# Patient Record
Sex: FEMALE | Race: BLACK OR AFRICAN AMERICAN | NOT HISPANIC OR LATINO | Employment: OTHER | RURAL
[De-identification: names, ages, dates, MRNs, and addresses within clinical notes are randomized per-mention and may not be internally consistent; named-entity substitution may affect disease eponyms.]

---

## 2022-05-02 ENCOUNTER — OFFICE VISIT (OUTPATIENT)
Dept: ORTHOPEDICS | Facility: CLINIC | Age: 67
End: 2022-05-02
Payer: MEDICARE

## 2022-05-02 VITALS — BODY MASS INDEX: 21.35 KG/M2 | HEIGHT: 62 IN | WEIGHT: 116 LBS

## 2022-05-02 DIAGNOSIS — M25.512 ACUTE PAIN OF LEFT SHOULDER: ICD-10-CM

## 2022-05-02 DIAGNOSIS — G56.03 BILATERAL CARPAL TUNNEL SYNDROME: Primary | ICD-10-CM

## 2022-05-02 PROCEDURE — 20610 DRAIN/INJ JOINT/BURSA W/O US: CPT | Mod: LT,,, | Performed by: NURSE PRACTITIONER

## 2022-05-02 PROCEDURE — 1159F MED LIST DOCD IN RCRD: CPT | Mod: CPTII,,, | Performed by: NURSE PRACTITIONER

## 2022-05-02 PROCEDURE — 3008F BODY MASS INDEX DOCD: CPT | Mod: CPTII,,, | Performed by: NURSE PRACTITIONER

## 2022-05-02 PROCEDURE — 1159F PR MEDICATION LIST DOCUMENTED IN MEDICAL RECORD: ICD-10-PCS | Mod: CPTII,,, | Performed by: NURSE PRACTITIONER

## 2022-05-02 PROCEDURE — 3008F PR BODY MASS INDEX (BMI) DOCUMENTED: ICD-10-PCS | Mod: CPTII,,, | Performed by: NURSE PRACTITIONER

## 2022-05-02 PROCEDURE — 99213 OFFICE O/P EST LOW 20 MIN: CPT | Mod: 25,,, | Performed by: NURSE PRACTITIONER

## 2022-05-02 PROCEDURE — 99213 PR OFFICE/OUTPT VISIT, EST, LEVL III, 20-29 MIN: ICD-10-PCS | Mod: 25,,, | Performed by: NURSE PRACTITIONER

## 2022-05-02 PROCEDURE — 20610 LARGE JOINT ASPIRATION/INJECTION: L SUBACROMIAL BURSA: ICD-10-PCS | Mod: LT,,, | Performed by: NURSE PRACTITIONER

## 2022-05-02 RX ORDER — GABAPENTIN 600 MG/1
TABLET ORAL
COMMUNITY

## 2022-05-02 RX ORDER — METHYLPREDNISOLONE ACETATE 40 MG/ML
40 INJECTION, SUSPENSION INTRA-ARTICULAR; INTRALESIONAL; INTRAMUSCULAR; SOFT TISSUE
COMMUNITY

## 2022-05-02 RX ORDER — ATENOLOL AND CHLORTHALIDONE TABLET 50; 25 MG/1; MG/1
TABLET ORAL
COMMUNITY

## 2022-05-02 RX ORDER — BACLOFEN 10 MG/1
TABLET ORAL
COMMUNITY

## 2022-05-02 RX ORDER — SEMAGLUTIDE 1.34 MG/ML
INJECTION, SOLUTION SUBCUTANEOUS
COMMUNITY

## 2022-05-02 RX ORDER — FUROSEMIDE 20 MG/1
TABLET ORAL
COMMUNITY

## 2022-05-02 RX ORDER — HYDROXYZINE HYDROCHLORIDE 50 MG/1
TABLET, FILM COATED ORAL
COMMUNITY

## 2022-05-02 RX ORDER — METFORMIN HYDROCHLORIDE 1000 MG/1
TABLET ORAL
COMMUNITY

## 2022-05-02 RX ORDER — KETOROLAC TROMETHAMINE 30 MG/ML
60 INJECTION, SOLUTION INTRAMUSCULAR; INTRAVENOUS
COMMUNITY

## 2022-05-02 RX ORDER — TRIAMCINOLONE ACETONIDE 40 MG/ML
80 INJECTION, SUSPENSION INTRA-ARTICULAR; INTRAMUSCULAR
Status: DISCONTINUED | OUTPATIENT
Start: 2022-05-02 | End: 2022-05-02 | Stop reason: HOSPADM

## 2022-05-02 RX ORDER — SIMVASTATIN 40 MG/1
TABLET, FILM COATED ORAL
COMMUNITY

## 2022-05-02 RX ORDER — ALLOPURINOL 100 MG/1
TABLET ORAL
COMMUNITY

## 2022-05-02 RX ORDER — EMPAGLIFLOZIN 10 MG/1
TABLET, FILM COATED ORAL
COMMUNITY

## 2022-05-02 RX ORDER — TRAMADOL HYDROCHLORIDE 50 MG/1
TABLET ORAL
COMMUNITY

## 2022-05-02 RX ORDER — ALENDRONATE SODIUM 70 MG/1
TABLET ORAL
COMMUNITY

## 2022-05-02 RX ORDER — CITALOPRAM 20 MG/1
TABLET, FILM COATED ORAL
COMMUNITY

## 2022-05-02 RX ORDER — POTASSIUM CHLORIDE 20 MEQ/1
TABLET, EXTENDED RELEASE ORAL
COMMUNITY

## 2022-05-02 RX ORDER — LISINOPRIL 10 MG/1
TABLET ORAL
COMMUNITY

## 2022-05-02 RX ORDER — DEXAMETHASONE SODIUM PHOSPHATE 4 MG/ML
4 INJECTION, SOLUTION INTRA-ARTICULAR; INTRALESIONAL; INTRAMUSCULAR; INTRAVENOUS; SOFT TISSUE
COMMUNITY

## 2022-05-02 RX ADMIN — TRIAMCINOLONE ACETONIDE 80 MG: 40 INJECTION, SUSPENSION INTRA-ARTICULAR; INTRAMUSCULAR at 01:05

## 2022-05-02 NOTE — PROGRESS NOTES
HPI:   Janaury Santillan is a pleasant 67 y.o. patient who reports to clinic for evaluation of bilateral carpal tunnel syndrome, pt states her left is worse, her hand,up to her elbow  is numb on left.   Reports her  is weak. Her fingers go numb. She thinks she had carpal tunnel surgery 10 years ago. Reports this episode started back about 1 year ago. She has also had neck problems in the past, seen by Dr Souza who did an injection. Reports she has pain in the left shoulder as well. Sates she had a RCR in 2012. She has pain lifing overhead. Injury onset and description: none  Patient's occupation: Retired  This is not a work related injury.   This injury has been non-responsive to conservative care. The pain is worse with repetitive use, and strenuous activity is very difficult.  her pain improves with rest.  she rates pain as a  9/10on the Visual Analog Scale.        PAST MEDICAL HISTORY:   Past Medical History:   Diagnosis Date    Diabetes mellitus, type 2     Hypertension      PAST SURGICAL HISTORY:   Past Surgical History:   Procedure Laterality Date    TUBAL LIGATION       MEDICATIONS:    Current Outpatient Medications:     alendronate (FOSAMAX) 70 MG tablet, alendronate 70 mg tablet  TAKE 1 TABLET BY MOUTH ONCE A WEEK, Disp: , Rfl:     allopurinoL (ZYLOPRIM) 100 MG tablet, allopurinol 100 mg tablet  TAKE 1 TABLET BY MOUTH ONCE DAILY, Disp: , Rfl:     atenoloL-chlorthalidone (TENORETIC) 50-25 mg Tab, atenolol 50 mg-chlorthalidone 25 mg tablet  TAKE 1 TABLET BY MOUTH ONCE DAILY, Disp: , Rfl:     baclofen (LIORESAL) 10 MG tablet, baclofen 10 mg tablet  Take 1 tablet 4 times a day by oral route as needed., Disp: , Rfl:     citalopram (CELEXA) 20 MG tablet, citalopram 20 mg tablet  TAKE 1 TABLET BY MOUTH ONCE DAILY AT BEDTIME, Disp: , Rfl:     dexamethasone (DECADRON) 4 mg/mL injection, 4 mg., Disp: , Rfl:     empagliflozin (JARDIANCE) 10 mg tablet, Jardiance 10 mg tablet  TAKE 1 TABLET BY MOUTH EVERY  DAY IN THE MORNING, Disp: , Rfl:     furosemide (LASIX) 20 MG tablet, furosemide 20 mg tablet  TAKE 1 TABLET BY MOUTH ONCE DAILY, Disp: , Rfl:     gabapentin (NEURONTIN) 600 MG tablet, gabapentin 600 mg tablet  TAKE 0.5 TABLET BY MOUTH IN AM AND 1 TABLET BY MOUTH IN PM, Disp: , Rfl:     hydrOXYzine (ATARAX) 50 MG tablet, hydroxyzine HCl 50 mg tablet  TAKE 1 TABLET BY MOUTH AS NEEDED AT BEDTIME, Disp: , Rfl:     ketorolac (TORADOL) 60 mg/2 mL Soln, 60 mg., Disp: , Rfl:     lisinopriL 10 MG tablet, lisinopril 10 mg tablet  Take 1 tablet by mouth once daily, Disp: , Rfl:     magnesium chloride (SLOW-MAG) 71.5 mg TbEC, Slow-Mag 71.5 mg tablet,delayed release  Take 1 tablet twice a day by oral route., Disp: , Rfl:     metFORMIN (GLUCOPHAGE) 1000 MG tablet, metformin 1,000 mg tablet  TAKE 1 TABLET BY MOUTH TWICE DAILY, Disp: , Rfl:     methylPREDNISolone acetate (DEPO-MEDROL) 40 mg/mL injection, 40 mg., Disp: , Rfl:     potassium chloride SA (K-DUR,KLOR-CON) 20 MEQ tablet, potassium chloride ER 20 mEq tablet,extended release(part/cryst)  TAKE 1 TABLET BY MOUTH DAILY, Disp: , Rfl:     semaglutide (OZEMPIC) 0.25 mg or 0.5 mg(2 mg/1.5 mL) pen injector, Ozempic 0.25 mg or 0.5 mg (2 mg/1.5 mL) subcutaneous pen injector  INJECT 0.5 MG UNDER THE SKIN EVERY WEEK, Disp: , Rfl:     simvastatin (ZOCOR) 40 MG tablet, simvastatin 40 mg tablet  TAKE 1 TABLET BY MOUTH ONCE DAILY, Disp: , Rfl:     traMADoL (ULTRAM) 50 mg tablet, tramadol 50 mg tablet  TAKE 1 TABLET BY MOUTH EVERY 12 HOURS AS NEEDED FOR PAIN, Disp: , Rfl:   ALLERGIES:   Review of patient's allergies indicates:  Not on File  REVIEW OF SYSTEMS:  Constitution: Negative. Negative for chills, fever and night sweats. HENT: Negative for congestion and headaches.  Eyes: Negative for blurred vision, left vision loss and right vision loss. Cardiovascular: Negative for chest pain and syncope. Respiratory: Negative for cough and shortness of breath.  Endocrine: Negative  "for polydipsia, polyphagia and polyuria. Hematologic/Lymphatic: Negative for bleeding problem. Does not bruise/bleed easily. Skin: Negative for dry skin, itching and rash.   Musculoskeletal: Negative for falls. Positive for hand pain and muscle weakness.     PHYSICAL EXAM:  VITAL SIGNS: Ht 5' 2" (1.575 m)   Wt 52.6 kg (116 lb)   BMI 21.22 kg/m²   General: Well-developed well-nourished 67 y.o. femalein no acute distress;Cardiovascular: Regular rhythm by palpation of distal pulse, normal color and temperature, no concerning varicosities on symptomatic side Lungs: No labored breathing or wheezing appreciated Neuro: Alert and oriented ×3 Psychiatric: well oriented to person, place and time, demonstrates normal mood and affect Skin: No rashes, lesions or ulcers, normal temperature, turgor, and texture on uninvolved extremity    General    Nursing note and vitals reviewed.  Constitutional: She is oriented to person, place, and time. She appears well-developed and well-nourished.   HENT:   Head: Normocephalic and atraumatic.   Nose: Nose normal.   Eyes: EOM are normal. Pupils are equal, round, and reactive to light.   Neck: Neck supple.   Cardiovascular: Normal rate, regular rhythm and intact distal pulses.    Pulmonary/Chest: Effort normal. No respiratory distress. She exhibits no tenderness.   Abdominal: Soft. She exhibits no distension. There is no abdominal tenderness.   Neurological: She is alert and oriented to person, place, and time. She has normal reflexes.   Psychiatric: She has a normal mood and affect. Her behavior is normal. Judgment and thought content normal.             Right Hand/Wrist Exam     Tests   Phalens sign: positive  Tinel's sign (median nerve): positive  Carpal Tunnel Compression Test: positive    Atrophy   Intrinsic:  negative  1st Dorsal Interosseous: negative    Other     Neuorologic Exam    Median Distribution: abnormal  Ulnar Distribution: normal  Radial Distribution: normal      Left " Hand/Wrist Exam     Inspection   Scars: Wrist - absent Hand -  absent  Effusion: Hand -  absent  Bruising: Wrist - absent Hand -  absent  Deformity: Wrist - absent Hand -  absent    Range of Motion     Wrist   Extension: normal   Flexion: normal   Pronation: normal   Supination: normal     Tests   Phalens sign: positive  Tinel's sign (median nerve): positive  Carpal Tunnel Compression Test: positive    Atrophy  Thenar:  positive  Hypothenar:  negative    Other     Sensory Exam  Median Distribution: abnormal          Left Shoulder Exam     Tenderness   The patient is tender to palpation of the supraspinatus and biceps tendon.    Crepitus   The patient has crepitus of the acromion    Range of Motion   External Rotation 0 degrees: normal   Internal rotation 0 degrees: normal   Internal rotation 90 degrees: normal     Tests & Signs   Apprehension: positive  Rotator Cuff Painful Arc/Range: moderate  Anterior Drawer Test: 2+  Relocation 90 degrees: positive  Jerk Test: positive      Muscle Strength   Right Upper Extremity   Wrist extension: 5/5   Wrist flexion: 5/5   : 4/5   Middle Finger: 5/5  Ring Finger: 5/5  Little Finger: 5/5  Left Upper Extremity  Wrist extension: 5/5   Wrist flexion: 5/5   :  4/5   Index Finger: 4/5  Middle Finger: 4/5  Ring Finger: 4/5    Vascular Exam       Capillary Refill  Right Hand: normal capillary refill  Left Mak's Test: rapid refill              IMAGING:  No results found.      ASSESSMENT:      ICD-10-CM ICD-9-CM   1. Bilateral carpal tunnel syndrome  G56.03 354.0   2. Acute pain of left shoulder  M25.512 719.41       PLAN:     -Findings and treatment options were discussed with the patient  -All questions answered  Left subacromial shoulder injection today  HEP  BUE EMGs RTC after to discuss results with Dr Loyd    There are no Patient Instructions on file for this visit.  Orders Placed This Encounter   Procedures    Large Joint Aspiration/Injection: L subacromial bursa      This order was created via procedure documentation     Large Joint Aspiration/Injection: L subacromial bursa    Date/Time: 5/2/2022 1:15 PM  Performed by: KHUSHBU Hare  Authorized by: KHUSHBU Hare     Consent Done?:  Yes (Verbal)  Indications:  Pain  Site marked: the procedure site was marked    Local anesthetic:  Bupivacaine 0.25% without epinephrine    Details:  Needle Size:  22 G  Approach:  Posterior  Location:  Shoulder  Site:  L subacromial bursa  Medications:  80 mg triamcinolone acetonide 40 mg/mL  Patient tolerance:  Patient tolerated the procedure well with no immediate complications

## 2022-06-30 ENCOUNTER — OFFICE VISIT (OUTPATIENT)
Dept: ORTHOPEDICS | Facility: CLINIC | Age: 67
End: 2022-06-30
Payer: MEDICARE

## 2022-06-30 DIAGNOSIS — G56.02 LEFT CARPAL TUNNEL SYNDROME: Primary | ICD-10-CM

## 2022-06-30 PROCEDURE — 99212 OFFICE O/P EST SF 10 MIN: CPT | Mod: 25,,, | Performed by: ORTHOPAEDIC SURGERY

## 2022-06-30 PROCEDURE — 20526 PR INJECT CARPAL TUNNEL: ICD-10-PCS | Mod: LT,,, | Performed by: ORTHOPAEDIC SURGERY

## 2022-06-30 PROCEDURE — 20526 THER INJECTION CARP TUNNEL: CPT | Mod: LT,,, | Performed by: ORTHOPAEDIC SURGERY

## 2022-06-30 PROCEDURE — 99212 PR OFFICE/OUTPT VISIT, EST, LEVL II, 10-19 MIN: ICD-10-PCS | Mod: 25,,, | Performed by: ORTHOPAEDIC SURGERY

## 2022-06-30 RX ORDER — NAPROXEN 500 MG/1
500 TABLET ORAL 2 TIMES DAILY WITH MEALS
Qty: 60 TABLET | Refills: 3 | Status: SHIPPED | OUTPATIENT
Start: 2022-06-30

## 2022-06-30 RX ORDER — TRIAMCINOLONE ACETONIDE 40 MG/ML
20 INJECTION, SUSPENSION INTRA-ARTICULAR; INTRAMUSCULAR
Status: DISCONTINUED | OUTPATIENT
Start: 2022-06-30 | End: 2022-06-30 | Stop reason: HOSPADM

## 2022-06-30 RX ADMIN — TRIAMCINOLONE ACETONIDE 20 MG: 40 INJECTION, SUSPENSION INTRA-ARTICULAR; INTRAMUSCULAR at 01:06

## 2022-06-30 NOTE — PROGRESS NOTES
67 y.o. Female returns to clinic for a follow up visit regarding     ICD-10-CM ICD-9-CM   1. Left carpal tunnel syndrome  G56.02 354.0        Pt is here for follow-up EMG'S carpal tunnel syndrome , left being the worse.       Past Medical History:   Diagnosis Date    Diabetes mellitus, type 2     Hypertension      Past Surgical History:   Procedure Laterality Date    TUBAL LIGATION           REVIEW OF SYSTEMS:   Constitution: Negative. Negative for chills, fever and night sweats.    Hematologic/Lymphatic: Negative for bleeding problem. Does not bruise/bleed easily.   Skin: Negative for dry skin, itching and rash.   Musculoskeletal: Negative for falls. Positive for knee pain and muscle weakness.   All other review of symptoms were reviewed and found to be noncontributory.     PHYSICAL EXAMINATION:  There were no vitals taken for this visit.  General    Nursing note and vitals reviewed.  Constitutional: She is oriented to person, place, and time. She appears well-developed and well-nourished.   HENT:   Head: Normocephalic and atraumatic.   Nose: Nose normal.   Eyes: EOM are normal. Pupils are equal, round, and reactive to light.   Neck: Neck supple.   Cardiovascular: Normal rate, regular rhythm and intact distal pulses.    Pulmonary/Chest: Effort normal. No respiratory distress. She exhibits no tenderness.   Abdominal: Soft. She exhibits no distension. There is no abdominal tenderness.   Neurological: She is alert and oriented to person, place, and time. She has normal reflexes.   Psychiatric: She has a normal mood and affect. Her behavior is normal. Judgment and thought content normal.             Right Hand/Wrist Exam     Inspection   Scars: Wrist - absent   Effusion: Wrist - absent   Bruising: Wrist - absent   Deformity: Wrist - deformity     Tests   Phalens sign: positive  Tinel's sign (median nerve): positive  Finkelstein's test: negative  Carpal Tunnel Compression Test: positive    Atrophy    Thenar:  negative  Intrinsic:  negative    Other     Neuorologic Exam    Median Distribution: abnormal  Ulnar Distribution: normal  Radial Distribution: normal      Left Hand/Wrist Exam     Inspection   Scars: Wrist - absent Hand -  absent  Effusion: Hand -  absent  Bruising: Wrist - absent Hand -  absent  Deformity: Wrist - absent Hand -  absent    Range of Motion     Wrist   Extension: normal   Flexion: normal   Pronation: normal   Supination: normal     Tests   Phalens sign: positive  Tinel's sign (median nerve): positive  Carpal Tunnel Compression Test: positive    Atrophy  Thenar:  positive  Hypothenar:  negative    Other     Sensory Exam  Median Distribution: abnormal          Muscle Strength   Right Upper Extremity   Wrist extension: 5/5   Wrist flexion: 5/5   : 4/5   Left Upper Extremity  Wrist extension: 5/5   Wrist flexion: 5/5   :  4/5   Index Finger: 4/5  Middle Finger: 4/5  Ring Finger: 4/5    Vascular Exam       Capillary Refill  Right Hand: normal capillary refill  Left Mak's Test: rapid refill              IMAGING:  No results found.   EMG results from Dr. Olguin's office reveal mild right carpal tunnel  ASSESSMENT:      ICD-10-CM ICD-9-CM   1. Left carpal tunnel syndrome  G56.02 354.0       PLAN:     -Findings and treatment options were discussed with the patient  -All questions answered      Left carpal tunnel injection given today.   Recommend brace use  Naproxen ordered as well  See back PRN    There are no Patient Instructions on file for this visit.      No orders of the defined types were placed in this encounter.        Carpal Tunnel    Date/Time: 6/30/2022 1:45 PM  Performed by: Evert López MD  Authorized by: Evert López MD     Consent Done?:  Yes (Verbal)  Indications:  Pain and joint swelling  Site marked: the procedure site was marked    Local anesthetic:  Bupivacaine 0.25% without epinephrine  Location:  Wrist  Needle size:  22 G  Medications:  20 mg triamcinolone  acetonide 40 mg/mL  Patient tolerance:  Patient tolerated the procedure well with no immediate complications     Additional Comments: Left carpal tunnel injected today

## 2023-07-12 DIAGNOSIS — M25.551 RIGHT HIP PAIN: Primary | ICD-10-CM

## 2023-07-18 ENCOUNTER — HOSPITAL ENCOUNTER (OUTPATIENT)
Dept: RADIOLOGY | Facility: HOSPITAL | Age: 68
Discharge: HOME | End: 2023-07-18
Attending: ORTHOPAEDIC SURGERY
Payer: MEDICARE

## 2023-07-18 ENCOUNTER — OFFICE VISIT (OUTPATIENT)
Dept: ORTHOPEDICS | Facility: CLINIC | Age: 68
End: 2023-07-18
Payer: MEDICARE

## 2023-07-18 DIAGNOSIS — M25.551 RIGHT HIP PAIN: ICD-10-CM

## 2023-07-18 DIAGNOSIS — M16.11 OSTEOARTHRITIS OF RIGHT HIP, UNSPECIFIED OSTEOARTHRITIS TYPE: ICD-10-CM

## 2023-07-18 DIAGNOSIS — M25.551 RIGHT HIP PAIN: Primary | ICD-10-CM

## 2023-07-18 PROCEDURE — 73502 X-RAY EXAM HIP UNI 2-3 VIEWS: CPT | Mod: TC,RT

## 2023-07-18 PROCEDURE — 1160F RVW MEDS BY RX/DR IN RCRD: CPT | Mod: CPTII,,, | Performed by: ORTHOPAEDIC SURGERY

## 2023-07-18 PROCEDURE — 4010F PR ACE/ARB THEARPY RXD/TAKEN: ICD-10-PCS | Mod: CPTII,,, | Performed by: ORTHOPAEDIC SURGERY

## 2023-07-18 PROCEDURE — 73502 XR HIP WITH PELVIS WHEN PERFORMED, 2 OR 3  VIEWS RIGHT: ICD-10-PCS | Mod: 26,RT,, | Performed by: ORTHOPAEDIC SURGERY

## 2023-07-18 PROCEDURE — 4010F ACE/ARB THERAPY RXD/TAKEN: CPT | Mod: CPTII,,, | Performed by: ORTHOPAEDIC SURGERY

## 2023-07-18 PROCEDURE — 1159F MED LIST DOCD IN RCRD: CPT | Mod: CPTII,,, | Performed by: ORTHOPAEDIC SURGERY

## 2023-07-18 PROCEDURE — 1160F PR REVIEW ALL MEDS BY PRESCRIBER/CLIN PHARMACIST DOCUMENTED: ICD-10-PCS | Mod: CPTII,,, | Performed by: ORTHOPAEDIC SURGERY

## 2023-07-18 PROCEDURE — 99213 OFFICE O/P EST LOW 20 MIN: CPT | Mod: S$PBB,,, | Performed by: ORTHOPAEDIC SURGERY

## 2023-07-18 PROCEDURE — 99213 OFFICE O/P EST LOW 20 MIN: CPT | Mod: PBBFAC | Performed by: ORTHOPAEDIC SURGERY

## 2023-07-18 PROCEDURE — 99213 PR OFFICE/OUTPT VISIT, EST, LEVL III, 20-29 MIN: ICD-10-PCS | Mod: S$PBB,,, | Performed by: ORTHOPAEDIC SURGERY

## 2023-07-18 PROCEDURE — 73502 X-RAY EXAM HIP UNI 2-3 VIEWS: CPT | Mod: 26,RT,, | Performed by: ORTHOPAEDIC SURGERY

## 2023-07-18 PROCEDURE — 1159F PR MEDICATION LIST DOCUMENTED IN MEDICAL RECORD: ICD-10-PCS | Mod: CPTII,,, | Performed by: ORTHOPAEDIC SURGERY

## 2023-07-18 NOTE — PROGRESS NOTES
ASSESSMENT:      ICD-10-CM ICD-9-CM   1. Right hip pain  M25.551 719.45       PLAN:     -Findings and treatment options were discussed with the patient  -All questions answered  Natural history and expected course discussed. Questions answered.  Educational materials distributed.  She has severe arthritic changes but wishes to be treated conservatively at this time.  We will arrange for intra-articular injection her right hip and see her back if she fails to get adequate improvement from that injection  There are no Patient Instructions on file for this visit.    IMAGING:  X-Ray Hip 2 or 3 views Right (with Pelvis when performed)    Result Date: 7/18/2023  See Procedure Notes for results. IMPRESSION: Please see Ortho procedure notes for report.  This procedure was auto-finalized by: Virtual Radiologist   Three views of the right hip were obtained today demonstrating severe degenerative changes of the right hip joint with a large cam deformity noted at the femoral head neck junction        CC: Hip pain  68 y.o. Female who presents as a new patient to me for evaluation of right hip pain.    Pain has been present since June 6. She states that the pain has only gotten a little bit better.   She describes her pain as a sharp pain that sometimes radiated down the front of her thigh.  Mechanism of injury: Patient got caught between two dogs fighting and she fell to the ground.   Location of the pain: groin  Occupation: retired  Mechanical symptoms, such as catching and popping of the hip are not present.    Symptoms are worsened with activity.  Better with rest. Treatment thus far has included rest, activity modifications, and oral medications.    she has not  had formal physical therapy  she has not had previous advanced imaging such as MRI.   she has not  had previous hip injections.   she has  had previous hip or back surgery. She had back surgery about 10 years ago.   Here today to discuss diagnosis and treatment  options.        Review of Systems  All other review of symptoms were reviewed and found to be noncontributory.     PAST MEDICAL HISTORY:   Past Medical History:   Diagnosis Date    Diabetes mellitus, type 2     Hypertension        PAST SURGICAL HISTORY:   Past Surgical History:   Procedure Laterality Date    TUBAL LIGATION         FAMILY HISTORY:   Family History   Problem Relation Age of Onset    Hypertension Mother     Diabetes Mother     Hypertension Father     Diabetes Sister     Diabetes Brother        SOCIAL HISTORY:   Social History     Socioeconomic History    Marital status: Unknown   Tobacco Use    Smoking status: Never    Smokeless tobacco: Never   Substance and Sexual Activity    Alcohol use: Never    Drug use: Never       MEDICATIONS:     Current Outpatient Medications:     alendronate (FOSAMAX) 70 MG tablet, alendronate 70 mg tablet  TAKE 1 TABLET BY MOUTH ONCE A WEEK, Disp: , Rfl:     allopurinoL (ZYLOPRIM) 100 MG tablet, allopurinol 100 mg tablet  TAKE 1 TABLET BY MOUTH ONCE DAILY, Disp: , Rfl:     atenoloL-chlorthalidone (TENORETIC) 50-25 mg Tab, atenolol 50 mg-chlorthalidone 25 mg tablet  TAKE 1 TABLET BY MOUTH ONCE DAILY, Disp: , Rfl:     baclofen (LIORESAL) 10 MG tablet, baclofen 10 mg tablet  Take 1 tablet 4 times a day by oral route as needed., Disp: , Rfl:     citalopram (CELEXA) 20 MG tablet, citalopram 20 mg tablet  TAKE 1 TABLET BY MOUTH ONCE DAILY AT BEDTIME, Disp: , Rfl:     dexamethasone (DECADRON) 4 mg/mL injection, 4 mg., Disp: , Rfl:     empagliflozin (JARDIANCE) 10 mg tablet, Jardiance 10 mg tablet  TAKE 1 TABLET BY MOUTH EVERY DAY IN THE MORNING, Disp: , Rfl:     furosemide (LASIX) 20 MG tablet, furosemide 20 mg tablet  TAKE 1 TABLET BY MOUTH ONCE DAILY, Disp: , Rfl:     gabapentin (NEURONTIN) 600 MG tablet, gabapentin 600 mg tablet  TAKE 0.5 TABLET BY MOUTH IN AM AND 1 TABLET BY MOUTH IN PM, Disp: , Rfl:     hydrOXYzine (ATARAX) 50 MG tablet, hydroxyzine HCl 50 mg tablet  TAKE 1  TABLET BY MOUTH AS NEEDED AT BEDTIME, Disp: , Rfl:     ketorolac (TORADOL) 60 mg/2 mL Soln, 60 mg., Disp: , Rfl:     lisinopriL 10 MG tablet, lisinopril 10 mg tablet  Take 1 tablet by mouth once daily, Disp: , Rfl:     magnesium chloride (SLOW-MAG) 71.5 mg TbEC, Slow-Mag 71.5 mg tablet,delayed release  Take 1 tablet twice a day by oral route., Disp: , Rfl:     metFORMIN (GLUCOPHAGE) 1000 MG tablet, metformin 1,000 mg tablet  TAKE 1 TABLET BY MOUTH TWICE DAILY, Disp: , Rfl:     methylPREDNISolone acetate (DEPO-MEDROL) 40 mg/mL injection, 40 mg., Disp: , Rfl:     naproxen (NAPROSYN) 500 MG tablet, Take 1 tablet (500 mg total) by mouth 2 (two) times daily with meals., Disp: 60 tablet, Rfl: 3    potassium chloride SA (K-DUR,KLOR-CON) 20 MEQ tablet, potassium chloride ER 20 mEq tablet,extended release(part/cryst)  TAKE 1 TABLET BY MOUTH DAILY, Disp: , Rfl:     semaglutide (OZEMPIC) 0.25 mg or 0.5 mg(2 mg/1.5 mL) pen injector, Ozempic 0.25 mg or 0.5 mg (2 mg/1.5 mL) subcutaneous pen injector  INJECT 0.5 MG UNDER THE SKIN EVERY WEEK, Disp: , Rfl:     simvastatin (ZOCOR) 40 MG tablet, simvastatin 40 mg tablet  TAKE 1 TABLET BY MOUTH ONCE DAILY, Disp: , Rfl:     traMADoL (ULTRAM) 50 mg tablet, tramadol 50 mg tablet  TAKE 1 TABLET BY MOUTH EVERY 12 HOURS AS NEEDED FOR PAIN, Disp: , Rfl:     ALLERGIES:   Review of patient's allergies indicates:  Not on File     PHYSICAL EXAMINATION:  There were no vitals taken for this visit.  General    Eyes: EOM are normal.   Cardiovascular:  Intact distal pulses.            Neurological: She displays normal reflexes. No cranial nerve deficit. She exhibits normal muscle tone. Coordination normal.     General Musculoskeletal Exam   Pelvic Obliquity: none        Right Hip Exam     Inspection   Deformity of hip.    Range of Motion   Extension:  abnormal   Flexion:  abnormal   External rotation:  abnormal   Internal rotation:  abnormal     Tests   Pain w/ forced internal rotation (MIRIAN):  present  Pain w/ forced external rotation (FADIR): present  Circumduction test: positive  Log Roll: positive    Other   Sensation: normal  Back (L-Spine & T-Spine) / Neck (C-Spine) Exam   Back exam is normal.    Back (L-Spine & T-Spine) Range of Motion   The patient has abnormal back ROM.      Vascular Exam     Right Pulses    Posterior Tibial:      2+          No orders of the defined types were placed in this encounter.    Procedures

## 2023-07-27 ENCOUNTER — HOSPITAL ENCOUNTER (OUTPATIENT)
Dept: RADIOLOGY | Facility: HOSPITAL | Age: 68
Discharge: HOME | End: 2023-07-27
Attending: ORTHOPAEDIC SURGERY
Payer: MEDICARE

## 2023-07-27 DIAGNOSIS — M25.551 RIGHT HIP PAIN: ICD-10-CM

## 2023-07-27 PROCEDURE — 27000525 FL ASPIRATION INJECTION MAJOR JOINT RIGHT WITH FLUORO

## 2023-07-27 PROCEDURE — 20610 DRAIN/INJ JOINT/BURSA W/O US: CPT | Mod: TC,RT

## 2023-07-27 RX ORDER — BUPIVACAINE HYDROCHLORIDE AND EPINEPHRINE 5; 5 MG/ML; UG/ML
5 INJECTION, SOLUTION EPIDURAL; INTRACAUDAL; PERINEURAL ONCE
Status: DISCONTINUED | OUTPATIENT
Start: 2023-07-27 | End: 2023-07-28 | Stop reason: HOSPADM

## 2023-07-27 NOTE — PROGRESS NOTES
Right hip steroid injection  Performed by A Alvarez A  Consent obtained for right hip steroid injection.  A formal timeout was called all staff present agree to patient and procedure.  Hip was prepped with ChloraPrep and sterile field was established.  1% lidocaine was used as local anesthetic.  Under fluoroscopic guidance a 22 gauge needle was placed into the right hip joint from an anterior approach.  4 cc of Isovue-300 was injected to confirm intra-articular placement.  4 cc of Marcaine and 40 mg of Kenalog was then injected into the right hip joint.  The needle was then removed and the puncture site was cleaned and bandaged.  The patient tolerated the procedure well there were no immediate postprocedure complications.  Total fluoroscopy time was 48 seconds.

## 2024-05-27 DIAGNOSIS — M25.551 PAIN IN RIGHT HIP: Primary | ICD-10-CM

## 2024-08-07 DIAGNOSIS — M16.11 OSTEOARTHRITIS OF RIGHT HIP, UNSPECIFIED OSTEOARTHRITIS TYPE: Primary | ICD-10-CM

## 2024-08-08 ENCOUNTER — HOSPITAL ENCOUNTER (OUTPATIENT)
Dept: RADIOLOGY | Facility: HOSPITAL | Age: 69
Discharge: HOME | End: 2024-08-08
Attending: ORTHOPAEDIC SURGERY
Payer: MEDICARE

## 2024-08-08 ENCOUNTER — OFFICE VISIT (OUTPATIENT)
Dept: ORTHOPEDICS | Facility: CLINIC | Age: 69
End: 2024-08-08
Payer: MEDICARE

## 2024-08-08 VITALS — HEIGHT: 62 IN | BODY MASS INDEX: 21.35 KG/M2 | WEIGHT: 116 LBS

## 2024-08-08 DIAGNOSIS — M16.11 OSTEOARTHRITIS OF RIGHT HIP: ICD-10-CM

## 2024-08-08 DIAGNOSIS — M16.11 OSTEOARTHRITIS OF RIGHT HIP, UNSPECIFIED OSTEOARTHRITIS TYPE: Primary | ICD-10-CM

## 2024-08-08 DIAGNOSIS — M16.11 OSTEOARTHRITIS OF RIGHT HIP, UNSPECIFIED OSTEOARTHRITIS TYPE: ICD-10-CM

## 2024-08-08 DIAGNOSIS — M25.551 PAIN IN RIGHT HIP: ICD-10-CM

## 2024-08-08 PROCEDURE — 73502 X-RAY EXAM HIP UNI 2-3 VIEWS: CPT | Mod: TC,RT

## 2024-08-08 PROCEDURE — 99999 PR PBB SHADOW E&M-EST. PATIENT-LVL IV: CPT | Mod: PBBFAC,,, | Performed by: ORTHOPAEDIC SURGERY

## 2024-08-08 PROCEDURE — 1159F MED LIST DOCD IN RCRD: CPT | Mod: CPTII,,, | Performed by: ORTHOPAEDIC SURGERY

## 2024-08-08 PROCEDURE — 73502 X-RAY EXAM HIP UNI 2-3 VIEWS: CPT | Mod: 26,RT,, | Performed by: ORTHOPAEDIC SURGERY

## 2024-08-08 PROCEDURE — 3008F BODY MASS INDEX DOCD: CPT | Mod: CPTII,,, | Performed by: ORTHOPAEDIC SURGERY

## 2024-08-08 PROCEDURE — 99214 OFFICE O/P EST MOD 30 MIN: CPT | Mod: PBBFAC,25 | Performed by: ORTHOPAEDIC SURGERY

## 2024-08-08 PROCEDURE — 99215 OFFICE O/P EST HI 40 MIN: CPT | Mod: S$PBB,,, | Performed by: ORTHOPAEDIC SURGERY

## 2024-08-08 PROCEDURE — 4010F ACE/ARB THERAPY RXD/TAKEN: CPT | Mod: CPTII,,, | Performed by: ORTHOPAEDIC SURGERY

## 2024-08-08 RX ORDER — MUPIROCIN 20 MG/G
OINTMENT TOPICAL
OUTPATIENT
Start: 2024-08-08

## 2024-08-08 RX ORDER — SODIUM CHLORIDE 9 MG/ML
INJECTION, SOLUTION INTRAVENOUS CONTINUOUS
OUTPATIENT
Start: 2024-08-08

## 2024-08-08 RX ORDER — OMEPRAZOLE 20 MG/1
20 CAPSULE, DELAYED RELEASE ORAL DAILY
COMMUNITY

## 2024-08-08 RX ORDER — TRANEXAMIC ACID 10 MG/ML
1000 INJECTION, SOLUTION INTRAVENOUS
OUTPATIENT
Start: 2024-08-08

## 2024-08-08 NOTE — H&P (VIEW-ONLY)
CC:  Hip pain  69 y.o. Female returns to clinic for a follow up visit regarding     ICD-10-CM ICD-9-CM   1. Osteoarthritis of right hip, unspecified osteoarthritis type  M16.11 715.95   2. Pain in right hip  M25.551 719.45       Patient states her last hip injection a year ago did not give her good relief.    She is having groin pain which radiates down into her foot. States is difficult for her walk and drive.      PAST MEDICAL HISTORY:   Past Medical History:   Diagnosis Date    Diabetes mellitus, type 2     Hypertension        PAST SURGICAL HISTORY:   Past Surgical History:   Procedure Laterality Date    HAND SURGERY      SHOULDER ARTHROSCOPY      TUBAL LIGATION           PHYSICAL EXAMINATION:  General    Eyes: EOM are normal.   Cardiovascular:  Intact distal pulses.            Neurological: She displays normal reflexes. No cranial nerve deficit. She exhibits normal muscle tone. Coordination normal.     General Musculoskeletal Exam   Pelvic Obliquity: none        Right Hip Exam     Inspection   Deformity of hip.    Range of Motion   Extension:  abnormal   Flexion:  abnormal   External rotation:  abnormal   Internal rotation:  abnormal     Tests   Pain w/ forced internal rotation (MIRIAN): present  Pain w/ forced external rotation (FADIR): present  Circumduction test: positive  Log Roll: positive    Other   Sensation: normal  Back (L-Spine & T-Spine) / Neck (C-Spine) Exam   Back exam is normal.    Back (L-Spine & T-Spine) Range of Motion   The patient has abnormal back ROM.      Vascular Exam     Right Pulses    Posterior Tibial:      2+            IMAGING:  X-Ray Hip 2 or 3 views Right with Pelvis when performed    Result Date: 8/8/2024  See Procedure Notes for results. IMPRESSION: Please see Ortho procedure notes for report.  This procedure was auto-finalized by: Virtual Radiologist     3 Radiographs of the right hip were reviewed today.   advanced osteoarthritic changes are present.        ASSESSMENT:       ICD-10-CM ICD-9-CM   1. Osteoarthritis of right hip, unspecified osteoarthritis type  M16.11 715.95   2. Pain in right hip  M25.551 719.45       PLAN:     -Findings and treatment options were discussed with the patient  -All questions answered  Natural history and expected course discussed. Questions answered.  Educational materials distributed.  Home exercises discussed.  Treatment options were discussed. The surgical process of  right hip replacement was discussed in detail with January Santillan   including a detailed discussion of the procedure itself including antibiotic therapy and prognosis. The typical perioperative and post-operative course was discussed and perioperative risks were discussed to the patient's satisfaction.  Risks and complications discussed included but were not limited to the risks of anesthetic complications, continued recurrence of infection, further surgery, fracture, bleeding, wound healing complications, aseptic loosening, instability, limb length inequality, neurologic dysfunction including numbness and wesakness,  DVT, pulmonary embolism, perioperative medical risks (cardiac, pulmonary, renal, neurologic), and death and the patient elects to proceed.   The patient should get medically cleared.  We will likely need to stay overnight in the hospital 1 night then can hopefully be discharged home  the following day    There are no Patient Instructions on file for this visit.    No orders of the defined types were placed in this encounter.      Procedures

## 2024-08-21 ENCOUNTER — OFFICE VISIT (OUTPATIENT)
Dept: INTERNAL MEDICINE | Facility: CLINIC | Age: 69
End: 2024-08-21
Payer: MEDICARE

## 2024-08-21 ENCOUNTER — HOSPITAL ENCOUNTER (OUTPATIENT)
Dept: RADIOLOGY | Facility: HOSPITAL | Age: 69
Discharge: HOME | End: 2024-08-21
Attending: ORTHOPAEDIC SURGERY
Payer: MEDICARE

## 2024-08-21 ENCOUNTER — CLINICAL SUPPORT (OUTPATIENT)
Dept: CARDIOLOGY | Facility: CLINIC | Age: 69
End: 2024-08-21
Payer: MEDICARE

## 2024-08-21 VITALS
OXYGEN SATURATION: 95 % | HEART RATE: 62 BPM | BODY MASS INDEX: 21.34 KG/M2 | HEIGHT: 62 IN | TEMPERATURE: 97 F | SYSTOLIC BLOOD PRESSURE: 126 MMHG | DIASTOLIC BLOOD PRESSURE: 80 MMHG | WEIGHT: 115.94 LBS

## 2024-08-21 DIAGNOSIS — M25.551 RIGHT HIP PAIN: ICD-10-CM

## 2024-08-21 DIAGNOSIS — E78.5 DYSLIPIDEMIA: ICD-10-CM

## 2024-08-21 DIAGNOSIS — M16.11 OSTEOARTHRITIS OF RIGHT HIP, UNSPECIFIED OSTEOARTHRITIS TYPE: Primary | ICD-10-CM

## 2024-08-21 DIAGNOSIS — Z01.818 PRE-PROCEDURAL EXAMINATION: ICD-10-CM

## 2024-08-21 DIAGNOSIS — K21.9 GASTROESOPHAGEAL REFLUX DISEASE, UNSPECIFIED WHETHER ESOPHAGITIS PRESENT: ICD-10-CM

## 2024-08-21 DIAGNOSIS — I10 ESSENTIAL HYPERTENSION: ICD-10-CM

## 2024-08-21 DIAGNOSIS — E11.9 TYPE 2 DIABETES MELLITUS WITHOUT COMPLICATION, WITHOUT LONG-TERM CURRENT USE OF INSULIN: ICD-10-CM

## 2024-08-21 DIAGNOSIS — Z01.818 PRE-PROCEDURAL EXAMINATION: Primary | ICD-10-CM

## 2024-08-21 DIAGNOSIS — Z01.818 PREOP EXAMINATION: Primary | ICD-10-CM

## 2024-08-21 PROCEDURE — 1159F MED LIST DOCD IN RCRD: CPT | Mod: CPTII,,, | Performed by: INTERNAL MEDICINE

## 2024-08-21 PROCEDURE — 99204 OFFICE O/P NEW MOD 45 MIN: CPT | Mod: S$PBB,,, | Performed by: INTERNAL MEDICINE

## 2024-08-21 PROCEDURE — 4010F ACE/ARB THERAPY RXD/TAKEN: CPT | Mod: CPTII,,, | Performed by: INTERNAL MEDICINE

## 2024-08-21 PROCEDURE — 71046 X-RAY EXAM CHEST 2 VIEWS: CPT | Mod: TC

## 2024-08-21 PROCEDURE — 99999 PR PBB SHADOW E&M-EST. PATIENT-LVL V: CPT | Mod: PBBFAC,,, | Performed by: INTERNAL MEDICINE

## 2024-08-21 PROCEDURE — 3008F BODY MASS INDEX DOCD: CPT | Mod: CPTII,,, | Performed by: INTERNAL MEDICINE

## 2024-08-21 PROCEDURE — 3288F FALL RISK ASSESSMENT DOCD: CPT | Mod: CPTII,,, | Performed by: INTERNAL MEDICINE

## 2024-08-21 PROCEDURE — 99215 OFFICE O/P EST HI 40 MIN: CPT | Mod: PBBFAC | Performed by: INTERNAL MEDICINE

## 2024-08-21 PROCEDURE — 1101F PT FALLS ASSESS-DOCD LE1/YR: CPT | Mod: CPTII,,, | Performed by: INTERNAL MEDICINE

## 2024-08-21 PROCEDURE — 93010 ELECTROCARDIOGRAM REPORT: CPT | Mod: S$PBB,,, | Performed by: HOSPITALIST

## 2024-08-21 PROCEDURE — 93005 ELECTROCARDIOGRAM TRACING: CPT | Mod: PBBFAC | Performed by: HOSPITALIST

## 2024-08-21 PROCEDURE — 99999 PR PBB SHADOW E&M-EST. PATIENT-LVL II: CPT | Mod: PBBFAC,,,

## 2024-08-21 PROCEDURE — 71046 X-RAY EXAM CHEST 2 VIEWS: CPT | Mod: 26,,, | Performed by: RADIOLOGY

## 2024-08-21 PROCEDURE — 3079F DIAST BP 80-89 MM HG: CPT | Mod: CPTII,,, | Performed by: INTERNAL MEDICINE

## 2024-08-21 PROCEDURE — 99212 OFFICE O/P EST SF 10 MIN: CPT | Mod: PBBFAC,25

## 2024-08-21 PROCEDURE — 3074F SYST BP LT 130 MM HG: CPT | Mod: CPTII,,, | Performed by: INTERNAL MEDICINE

## 2024-08-21 RX ORDER — DAPAGLIFLOZIN 5 MG/1
5 TABLET, FILM COATED ORAL DAILY
COMMUNITY
Start: 2024-05-14

## 2024-08-21 RX ORDER — LIRAGLUTIDE 6 MG/ML
0.6 INJECTION SUBCUTANEOUS DAILY
COMMUNITY

## 2024-08-21 NOTE — PROGRESS NOTES
Subjective     Patient ID: January Roque is a 69 y.o. female.    Chief Complaint: Pre-op Exam (Hip surg/)    Mrs. ROQUE is a 69-year-old female the presents today for surgical preop risk stratification.  She is scheduled to have right hip replacement on August the 26th.  She has had multiple orthopedic procedures done in the past without any complications.  She has a past medical history of hypertension, diabetes mellitus type 2, GERD, dyslipidemia, and osteoarthritis.  No known history of coronary artery disease, cerebrovascular disease, CHF, or valvular heart disease.  No family history of premature coronary artery disease.  She denies any chest pain at rest or with exertion.  She states her blood sugars tend to run between 105 and 120.  Blood pressure today is 126/80.  She is resting comfortably in no distress.  She is afebrile and vital signs are stable.      Review of Systems   Constitutional:  Negative for appetite change, chills, fatigue and fever.   HENT:  Negative for nasal congestion, ear pain, hearing loss, sinus pressure/congestion and sore throat.    Eyes:  Negative for pain, redness and visual disturbance.   Respiratory:  Negative for apnea, cough, shortness of breath and wheezing.    Cardiovascular:  Negative for chest pain and palpitations.   Gastrointestinal:  Negative for abdominal pain, constipation, diarrhea and nausea.   Endocrine: Negative for cold intolerance, heat intolerance and polyuria.   Genitourinary:  Negative for dysuria and hematuria.   Musculoskeletal:  Positive for arthralgias. Negative for back pain, joint swelling, myalgias and neck pain.   Integumentary:  Negative for pallor, rash and wound.   Allergic/Immunologic: Negative for immunocompromised state.   Neurological:  Negative for tremors, seizures, weakness, headaches and memory loss.   Hematological:  Negative for adenopathy.   Psychiatric/Behavioral:  Negative for confusion, dysphoric mood and sleep disturbance. The patient is  not nervous/anxious.           Objective     Physical Exam  Vitals and nursing note reviewed.   Constitutional:       General: She is not in acute distress.     Appearance: Normal appearance. She is not ill-appearing.   HENT:      Head: Normocephalic and atraumatic.      Right Ear: External ear normal.      Left Ear: External ear normal.      Nose: Nose normal.      Mouth/Throat:      Pharynx: Oropharynx is clear.   Eyes:      Extraocular Movements: Extraocular movements intact.      Conjunctiva/sclera: Conjunctivae normal.      Pupils: Pupils are equal, round, and reactive to light.   Neck:      Vascular: No carotid bruit.   Cardiovascular:      Rate and Rhythm: Normal rate and regular rhythm.      Pulses: Normal pulses.      Heart sounds: Normal heart sounds. No murmur heard.  Pulmonary:      Effort: No respiratory distress.      Breath sounds: Normal breath sounds. No wheezing or rales.   Abdominal:      General: Bowel sounds are normal.      Palpations: Abdomen is soft.   Musculoskeletal:      Cervical back: Normal range of motion and neck supple.      Right lower leg: No edema.      Left lower leg: No edema.   Skin:     General: Skin is warm and dry.      Capillary Refill: Capillary refill takes less than 2 seconds.      Coloration: Skin is not pale.   Neurological:      General: No focal deficit present.      Mental Status: She is alert and oriented to person, place, and time.      Cranial Nerves: No cranial nerve deficit.      Motor: No weakness.   Psychiatric:         Mood and Affect: Mood normal.         Judgment: Judgment normal.            Assessment and Plan     1. Preop examination    2. Right hip pain    3. Type 2 diabetes mellitus without complication, without long-term current use of insulin    4. Essential hypertension    5. Dyslipidemia    6. Gastroesophageal reflux disease, unspecified whether esophagitis present        1. Right hip osteoarthritis-plan for hip replacement.  We have reviewed  available lab work and imaging.  No known history of coronary artery disease, cerebrovascular disease, CHF, or valvular heart disease.  She denies any chest pain at rest or with exertion.  Her revised cardiac risk index is class 1.  ACS-SRC risk is 5.7% for any complication and 4.7% for series complication.  Overall she is considered low risk for this procedure.  Okay to proceed to surgery without any further testing.    2. Diabetes mellitus type 2-we are going to check an A1c today.  She is on Farxiga, metformin, and Victoza.  She will need to hold her metformin perioperatively.  Can cover with sliding scale if needed.    3. Essential hypertension-blood pressure well controlled.  Continue home medications perioperatively.    4. Dyslipidemia-patient is on a statin    5. GERD-continue PPI    Billing based on moderate level of medical decision-making         No follow-ups on file.

## 2024-08-22 ENCOUNTER — HOSPITAL ENCOUNTER (OUTPATIENT)
Dept: RADIOLOGY | Facility: HOSPITAL | Age: 69
Discharge: HOME | End: 2024-08-22
Attending: ORTHOPAEDIC SURGERY
Payer: MEDICARE

## 2024-08-22 DIAGNOSIS — M16.11 OSTEOARTHRITIS OF RIGHT HIP, UNSPECIFIED OSTEOARTHRITIS TYPE: ICD-10-CM

## 2024-08-22 PROCEDURE — 73700 CT LOWER EXTREMITY W/O DYE: CPT | Mod: TC,RT

## 2024-08-22 PROCEDURE — 73700 CT LOWER EXTREMITY W/O DYE: CPT | Mod: 26,RT,, | Performed by: RADIOLOGY

## 2024-08-26 ENCOUNTER — HOSPITAL ENCOUNTER (INPATIENT)
Facility: HOSPITAL | Age: 69
LOS: 2 days | Discharge: NURSING, REHAB OR LONG-TERM CARE | DRG: 470 | End: 2024-08-29
Attending: ORTHOPAEDIC SURGERY | Admitting: ORTHOPAEDIC SURGERY
Payer: MEDICARE

## 2024-08-26 ENCOUNTER — ANESTHESIA (OUTPATIENT)
Dept: SURGERY | Facility: HOSPITAL | Age: 69
DRG: 470 | End: 2024-08-26
Payer: MEDICARE

## 2024-08-26 ENCOUNTER — ANESTHESIA EVENT (OUTPATIENT)
Dept: SURGERY | Facility: HOSPITAL | Age: 69
DRG: 470 | End: 2024-08-26
Payer: MEDICARE

## 2024-08-26 DIAGNOSIS — M16.11 OSTEOARTHRITIS OF RIGHT HIP: Primary | ICD-10-CM

## 2024-08-26 DIAGNOSIS — M16.11 OSTEOARTHRITIS OF RIGHT HIP, UNSPECIFIED OSTEOARTHRITIS TYPE: ICD-10-CM

## 2024-08-26 PROBLEM — K27.9 PEPTIC ULCER: Status: ACTIVE | Noted: 2024-08-26

## 2024-08-26 LAB
EST. AVERAGE GLUCOSE BLD GHB EST-MCNC: 163 MG/DL
GLUCOSE SERPL-MCNC: 200 MG/DL (ref 70–105)
GLUCOSE SERPL-MCNC: 231 MG/DL (ref 70–105)
GLUCOSE SERPL-MCNC: 294 MG/DL (ref 70–105)
HBA1C MFR BLD HPLC: 7.3 % (ref 4.5–6.6)
INDIRECT COOMBS: NORMAL
OHS QRS DURATION: 70 MS
OHS QTC CALCULATION: 425 MS
RH BLD: NORMAL
SPECIMEN OUTDATE: NORMAL

## 2024-08-26 PROCEDURE — 64450 NJX AA&/STRD OTHER PN/BRANCH: CPT | Mod: 59,RT,, | Performed by: ANESTHESIOLOGY

## 2024-08-26 PROCEDURE — 36415 COLL VENOUS BLD VENIPUNCTURE: CPT | Performed by: ORTHOPAEDIC SURGERY

## 2024-08-26 PROCEDURE — 63600175 PHARM REV CODE 636 W HCPCS: Performed by: HOSPITALIST

## 2024-08-26 PROCEDURE — 86900 BLOOD TYPING SEROLOGIC ABO: CPT | Performed by: ORTHOPAEDIC SURGERY

## 2024-08-26 PROCEDURE — 82962 GLUCOSE BLOOD TEST: CPT

## 2024-08-26 PROCEDURE — 83036 HEMOGLOBIN GLYCOSYLATED A1C: CPT | Performed by: HOSPITALIST

## 2024-08-26 PROCEDURE — D9220A PRA ANESTHESIA: Mod: CRNA,,, | Performed by: NURSE ANESTHETIST, CERTIFIED REGISTERED

## 2024-08-26 PROCEDURE — 0SR904A REPLACEMENT OF RIGHT HIP JOINT WITH CERAMIC ON POLYETHYLENE SYNTHETIC SUBSTITUTE, UNCEMENTED, OPEN APPROACH: ICD-10-PCS | Performed by: ORTHOPAEDIC SURGERY

## 2024-08-26 PROCEDURE — 71000033 HC RECOVERY, INTIAL HOUR: Performed by: ORTHOPAEDIC SURGERY

## 2024-08-26 PROCEDURE — 36000713 HC OR TIME LEV V EA ADD 15 MIN: Performed by: ORTHOPAEDIC SURGERY

## 2024-08-26 PROCEDURE — 36000712 HC OR TIME LEV V 1ST 15 MIN: Performed by: ORTHOPAEDIC SURGERY

## 2024-08-26 PROCEDURE — 76942 ECHO GUIDE FOR BIOPSY: CPT | Mod: 26,,, | Performed by: ANESTHESIOLOGY

## 2024-08-26 PROCEDURE — 88311 DECALCIFY TISSUE: CPT | Mod: 26,,, | Performed by: PATHOLOGY

## 2024-08-26 PROCEDURE — C1776 JOINT DEVICE (IMPLANTABLE): HCPCS | Performed by: ORTHOPAEDIC SURGERY

## 2024-08-26 PROCEDURE — 37000008 HC ANESTHESIA 1ST 15 MINUTES: Performed by: ORTHOPAEDIC SURGERY

## 2024-08-26 PROCEDURE — 63600175 PHARM REV CODE 636 W HCPCS: Performed by: ANESTHESIOLOGY

## 2024-08-26 PROCEDURE — 27130 TOTAL HIP ARTHROPLASTY: CPT | Mod: RT,,, | Performed by: ORTHOPAEDIC SURGERY

## 2024-08-26 PROCEDURE — 63600175 PHARM REV CODE 636 W HCPCS: Performed by: NURSE ANESTHETIST, CERTIFIED REGISTERED

## 2024-08-26 PROCEDURE — 97162 PT EVAL MOD COMPLEX 30 MIN: CPT

## 2024-08-26 PROCEDURE — 63600175 PHARM REV CODE 636 W HCPCS: Performed by: ORTHOPAEDIC SURGERY

## 2024-08-26 PROCEDURE — 25000003 PHARM REV CODE 250: Performed by: ORTHOPAEDIC SURGERY

## 2024-08-26 PROCEDURE — 27000655: Performed by: NURSE ANESTHETIST, CERTIFIED REGISTERED

## 2024-08-26 PROCEDURE — C1713 ANCHOR/SCREW BN/BN,TIS/BN: HCPCS | Performed by: ORTHOPAEDIC SURGERY

## 2024-08-26 PROCEDURE — 0055T BONE SRGRY CMPTR CT/MRI IMAG: CPT | Mod: ,,, | Performed by: ORTHOPAEDIC SURGERY

## 2024-08-26 PROCEDURE — 37000009 HC ANESTHESIA EA ADD 15 MINS: Performed by: ORTHOPAEDIC SURGERY

## 2024-08-26 PROCEDURE — 8E0Y0CZ ROBOTIC ASSISTED PROCEDURE OF LOWER EXTREMITY, OPEN APPROACH: ICD-10-PCS | Performed by: ORTHOPAEDIC SURGERY

## 2024-08-26 PROCEDURE — 27000716 HC OXISENSOR PROBE, ANY SIZE: Performed by: NURSE ANESTHETIST, CERTIFIED REGISTERED

## 2024-08-26 PROCEDURE — 25000003 PHARM REV CODE 250: Performed by: NURSE ANESTHETIST, CERTIFIED REGISTERED

## 2024-08-26 PROCEDURE — 99214 OFFICE O/P EST MOD 30 MIN: CPT | Mod: ,,, | Performed by: HOSPITALIST

## 2024-08-26 PROCEDURE — 27000510 HC BLANKET BAIR HUGGER ANY SIZE: Performed by: NURSE ANESTHETIST, CERTIFIED REGISTERED

## 2024-08-26 PROCEDURE — D9220A PRA ANESTHESIA: Mod: ANES,,, | Performed by: ANESTHESIOLOGY

## 2024-08-26 PROCEDURE — 36415 COLL VENOUS BLD VENIPUNCTURE: CPT | Mod: 91 | Performed by: HOSPITALIST

## 2024-08-26 PROCEDURE — 27000689 HC BLADE LARYNGOSCOPE ANY SIZE: Performed by: NURSE ANESTHETIST, CERTIFIED REGISTERED

## 2024-08-26 PROCEDURE — 88311 DECALCIFY TISSUE: CPT | Mod: TC,SUR | Performed by: ORTHOPAEDIC SURGERY

## 2024-08-26 PROCEDURE — 27000165 HC TUBE, ETT CUFFED: Performed by: NURSE ANESTHETIST, CERTIFIED REGISTERED

## 2024-08-26 PROCEDURE — 86901 BLOOD TYPING SEROLOGIC RH(D): CPT | Performed by: ORTHOPAEDIC SURGERY

## 2024-08-26 PROCEDURE — 94761 N-INVAS EAR/PLS OXIMETRY MLT: CPT

## 2024-08-26 PROCEDURE — 27201960 HC SPINAL TRAY: Performed by: NURSE ANESTHETIST, CERTIFIED REGISTERED

## 2024-08-26 PROCEDURE — 27201423 OPTIME MED/SURG SUP & DEVICES STERILE SUPPLY: Performed by: ORTHOPAEDIC SURGERY

## 2024-08-26 PROCEDURE — 97165 OT EVAL LOW COMPLEX 30 MIN: CPT

## 2024-08-26 PROCEDURE — 88304 TISSUE EXAM BY PATHOLOGIST: CPT | Mod: TC,SUR | Performed by: ORTHOPAEDIC SURGERY

## 2024-08-26 PROCEDURE — 88304 TISSUE EXAM BY PATHOLOGIST: CPT | Mod: 26,,, | Performed by: PATHOLOGY

## 2024-08-26 DEVICE — TRIDENT X3 0 DEGREE POLYETHYLENE INSERT
Type: IMPLANTABLE DEVICE | Site: HIP | Status: FUNCTIONAL
Brand: TRIDENT X3 INSERT

## 2024-08-26 DEVICE — 132 DEGREE NECK ANGLE HIP STEM
Type: IMPLANTABLE DEVICE | Site: HIP | Status: FUNCTIONAL
Brand: ACCOLADE

## 2024-08-26 DEVICE — 6.5MM LOW PROFILE HEX SCREW 35MM
Type: IMPLANTABLE DEVICE | Site: HIP | Status: FUNCTIONAL
Brand: TRIDENT II

## 2024-08-26 DEVICE — CERAMIC V40 FEMORAL HEAD
Type: IMPLANTABLE DEVICE | Site: HIP | Status: FUNCTIONAL
Brand: BIOLOX

## 2024-08-26 RX ORDER — HYDROMORPHONE HYDROCHLORIDE 2 MG/ML
0.5 INJECTION, SOLUTION INTRAMUSCULAR; INTRAVENOUS; SUBCUTANEOUS EVERY 5 MIN PRN
Status: DISCONTINUED | OUTPATIENT
Start: 2024-08-26 | End: 2024-08-26 | Stop reason: HOSPADM

## 2024-08-26 RX ORDER — BISACODYL 10 MG/1
10 SUPPOSITORY RECTAL DAILY PRN
Status: DISCONTINUED | OUTPATIENT
Start: 2024-08-26 | End: 2024-08-29 | Stop reason: HOSPADM

## 2024-08-26 RX ORDER — IBUPROFEN 200 MG
16 TABLET ORAL
Status: DISCONTINUED | OUTPATIENT
Start: 2024-08-26 | End: 2024-08-29 | Stop reason: HOSPADM

## 2024-08-26 RX ORDER — MUPIROCIN 20 MG/G
1 OINTMENT TOPICAL 2 TIMES DAILY
Status: DISCONTINUED | OUTPATIENT
Start: 2024-08-26 | End: 2024-08-29 | Stop reason: HOSPADM

## 2024-08-26 RX ORDER — ATENOLOL 25 MG/1
50 TABLET ORAL DAILY
Status: DISCONTINUED | OUTPATIENT
Start: 2024-08-26 | End: 2024-08-29 | Stop reason: HOSPADM

## 2024-08-26 RX ORDER — BUPIVACAINE HYDROCHLORIDE 7.5 MG/ML
INJECTION, SOLUTION EPIDURAL; RETROBULBAR
Status: COMPLETED | OUTPATIENT
Start: 2024-08-26 | End: 2024-08-26

## 2024-08-26 RX ORDER — IPRATROPIUM BROMIDE AND ALBUTEROL SULFATE 2.5; .5 MG/3ML; MG/3ML
3 SOLUTION RESPIRATORY (INHALATION) ONCE AS NEEDED
Status: DISCONTINUED | OUTPATIENT
Start: 2024-08-26 | End: 2024-08-26 | Stop reason: HOSPADM

## 2024-08-26 RX ORDER — ROCURONIUM BROMIDE 10 MG/ML
INJECTION, SOLUTION INTRAVENOUS
Status: DISCONTINUED | OUTPATIENT
Start: 2024-08-26 | End: 2024-08-26

## 2024-08-26 RX ORDER — ONDANSETRON 4 MG/1
8 TABLET, ORALLY DISINTEGRATING ORAL EVERY 8 HOURS PRN
Status: DISCONTINUED | OUTPATIENT
Start: 2024-08-26 | End: 2024-08-29 | Stop reason: HOSPADM

## 2024-08-26 RX ORDER — LISINOPRIL 10 MG/1
10 TABLET ORAL DAILY
Status: DISCONTINUED | OUTPATIENT
Start: 2024-08-26 | End: 2024-08-29 | Stop reason: HOSPADM

## 2024-08-26 RX ORDER — ATENOLOL AND CHLORTHALIDONE TABLET 50; 25 MG/1; MG/1
1 TABLET ORAL DAILY
Status: DISCONTINUED | OUTPATIENT
Start: 2024-08-26 | End: 2024-08-26

## 2024-08-26 RX ORDER — IBUPROFEN 200 MG
24 TABLET ORAL
Status: DISCONTINUED | OUTPATIENT
Start: 2024-08-26 | End: 2024-08-29 | Stop reason: HOSPADM

## 2024-08-26 RX ORDER — TRANEXAMIC ACID 10 MG/ML
1000 INJECTION, SOLUTION INTRAVENOUS
Status: DISCONTINUED | OUTPATIENT
Start: 2024-08-26 | End: 2024-08-26 | Stop reason: HOSPADM

## 2024-08-26 RX ORDER — ROPIVACAINE HYDROCHLORIDE 7.5 MG/ML
INJECTION, SOLUTION EPIDURAL; PERINEURAL
Status: COMPLETED | OUTPATIENT
Start: 2024-08-26 | End: 2024-08-26

## 2024-08-26 RX ORDER — MUPIROCIN 20 MG/G
OINTMENT TOPICAL
Status: DISCONTINUED | OUTPATIENT
Start: 2024-08-26 | End: 2024-08-26 | Stop reason: HOSPADM

## 2024-08-26 RX ORDER — LIRAGLUTIDE 6 MG/ML
0.6 INJECTION SUBCUTANEOUS DAILY
Status: DISCONTINUED | OUTPATIENT
Start: 2024-08-26 | End: 2024-08-29 | Stop reason: HOSPADM

## 2024-08-26 RX ORDER — ACETAMINOPHEN 500 MG
1000 TABLET ORAL EVERY 8 HOURS
Status: DISCONTINUED | OUTPATIENT
Start: 2024-08-26 | End: 2024-08-26

## 2024-08-26 RX ORDER — PROPOFOL 10 MG/ML
VIAL (ML) INTRAVENOUS
Status: DISCONTINUED | OUTPATIENT
Start: 2024-08-26 | End: 2024-08-26

## 2024-08-26 RX ORDER — MEPERIDINE HYDROCHLORIDE 25 MG/ML
25 INJECTION INTRAMUSCULAR; INTRAVENOUS; SUBCUTANEOUS ONCE AS NEEDED
Status: DISCONTINUED | OUTPATIENT
Start: 2024-08-26 | End: 2024-08-26 | Stop reason: HOSPADM

## 2024-08-26 RX ORDER — INSULIN ASPART 100 [IU]/ML
0-5 INJECTION, SOLUTION INTRAVENOUS; SUBCUTANEOUS
Status: DISCONTINUED | OUTPATIENT
Start: 2024-08-26 | End: 2024-08-29 | Stop reason: HOSPADM

## 2024-08-26 RX ORDER — CEFAZOLIN SODIUM 1 G/3ML
INJECTION, POWDER, FOR SOLUTION INTRAMUSCULAR; INTRAVENOUS
Status: DISCONTINUED | OUTPATIENT
Start: 2024-08-26 | End: 2024-08-26

## 2024-08-26 RX ORDER — TRANEXAMIC ACID 100 MG/ML
INJECTION, SOLUTION INTRAVENOUS
Status: DISCONTINUED | OUTPATIENT
Start: 2024-08-26 | End: 2024-08-26

## 2024-08-26 RX ORDER — PREGABALIN 75 MG/1
75 CAPSULE ORAL 2 TIMES DAILY
Status: DISCONTINUED | OUTPATIENT
Start: 2024-08-26 | End: 2024-08-29 | Stop reason: HOSPADM

## 2024-08-26 RX ORDER — EPHEDRINE SULFATE 50 MG/ML
INJECTION, SOLUTION INTRAVENOUS
Status: DISCONTINUED | OUTPATIENT
Start: 2024-08-26 | End: 2024-08-26

## 2024-08-26 RX ORDER — NAPROXEN SODIUM 220 MG/1
325 TABLET, FILM COATED ORAL DAILY
Status: DISCONTINUED | OUTPATIENT
Start: 2024-08-27 | End: 2024-08-29 | Stop reason: HOSPADM

## 2024-08-26 RX ORDER — GLUCAGON 1 MG
1 KIT INJECTION
Status: DISCONTINUED | OUTPATIENT
Start: 2024-08-26 | End: 2024-08-29 | Stop reason: HOSPADM

## 2024-08-26 RX ORDER — LIDOCAINE HYDROCHLORIDE 20 MG/ML
INJECTION, SOLUTION EPIDURAL; INFILTRATION; INTRACAUDAL; PERINEURAL
Status: DISCONTINUED | OUTPATIENT
Start: 2024-08-26 | End: 2024-08-26

## 2024-08-26 RX ORDER — OXYCODONE HYDROCHLORIDE 5 MG/1
5 TABLET ORAL EVERY 4 HOURS PRN
Status: DISCONTINUED | OUTPATIENT
Start: 2024-08-26 | End: 2024-08-29 | Stop reason: HOSPADM

## 2024-08-26 RX ORDER — POTASSIUM CHLORIDE 20 MEQ/1
20 TABLET, EXTENDED RELEASE ORAL DAILY
Status: DISCONTINUED | OUTPATIENT
Start: 2024-08-26 | End: 2024-08-29 | Stop reason: HOSPADM

## 2024-08-26 RX ORDER — DOCUSATE SODIUM 100 MG/1
100 CAPSULE, LIQUID FILLED ORAL EVERY 12 HOURS
Status: DISCONTINUED | OUTPATIENT
Start: 2024-08-26 | End: 2024-08-28

## 2024-08-26 RX ORDER — MORPHINE SULFATE 4 MG/ML
3 INJECTION, SOLUTION INTRAMUSCULAR; INTRAVENOUS EVERY 6 HOURS PRN
Status: DISCONTINUED | OUTPATIENT
Start: 2024-08-26 | End: 2024-08-29 | Stop reason: HOSPADM

## 2024-08-26 RX ORDER — METFORMIN HYDROCHLORIDE 500 MG/1
1000 TABLET ORAL 2 TIMES DAILY WITH MEALS
Status: DISCONTINUED | OUTPATIENT
Start: 2024-08-26 | End: 2024-08-29 | Stop reason: HOSPADM

## 2024-08-26 RX ORDER — ONDANSETRON HYDROCHLORIDE 2 MG/ML
4 INJECTION, SOLUTION INTRAVENOUS DAILY PRN
Status: DISCONTINUED | OUTPATIENT
Start: 2024-08-26 | End: 2024-08-26 | Stop reason: HOSPADM

## 2024-08-26 RX ORDER — SODIUM CHLORIDE 9 MG/ML
INJECTION, SOLUTION INTRAVENOUS
Status: DISCONTINUED | OUTPATIENT
Start: 2024-08-26 | End: 2024-08-29 | Stop reason: HOSPADM

## 2024-08-26 RX ORDER — FAMOTIDINE 20 MG/1
20 TABLET, FILM COATED ORAL 2 TIMES DAILY
Status: DISCONTINUED | OUTPATIENT
Start: 2024-08-26 | End: 2024-08-29 | Stop reason: HOSPADM

## 2024-08-26 RX ORDER — ACETAMINOPHEN 10 MG/ML
1000 INJECTION, SOLUTION INTRAVENOUS ONCE
Status: DISCONTINUED | OUTPATIENT
Start: 2024-08-26 | End: 2024-08-26

## 2024-08-26 RX ORDER — ONDANSETRON HYDROCHLORIDE 2 MG/ML
INJECTION, SOLUTION INTRAVENOUS
Status: DISCONTINUED | OUTPATIENT
Start: 2024-08-26 | End: 2024-08-26

## 2024-08-26 RX ORDER — FUROSEMIDE 20 MG/1
20 TABLET ORAL DAILY
Status: DISCONTINUED | OUTPATIENT
Start: 2024-08-26 | End: 2024-08-29 | Stop reason: HOSPADM

## 2024-08-26 RX ORDER — GLUCAGON 1 MG
1 KIT INJECTION
Status: DISCONTINUED | OUTPATIENT
Start: 2024-08-26 | End: 2024-08-26 | Stop reason: HOSPADM

## 2024-08-26 RX ORDER — HYDROXYZINE HYDROCHLORIDE 25 MG/1
50 TABLET, FILM COATED ORAL 3 TIMES DAILY PRN
Status: DISCONTINUED | OUTPATIENT
Start: 2024-08-26 | End: 2024-08-29 | Stop reason: HOSPADM

## 2024-08-26 RX ORDER — LOPERAMIDE HYDROCHLORIDE 2 MG/1
4 CAPSULE ORAL ONCE
Status: COMPLETED | OUTPATIENT
Start: 2024-08-26 | End: 2024-08-26

## 2024-08-26 RX ORDER — DIPHENHYDRAMINE HYDROCHLORIDE 50 MG/ML
25 INJECTION INTRAMUSCULAR; INTRAVENOUS EVERY 6 HOURS PRN
Status: DISCONTINUED | OUTPATIENT
Start: 2024-08-26 | End: 2024-08-26 | Stop reason: HOSPADM

## 2024-08-26 RX ORDER — ZOLPIDEM TARTRATE 5 MG/1
5 TABLET ORAL NIGHTLY PRN
Status: DISCONTINUED | OUTPATIENT
Start: 2024-08-26 | End: 2024-08-29 | Stop reason: HOSPADM

## 2024-08-26 RX ORDER — SODIUM CHLORIDE 0.9 % (FLUSH) 0.9 %
3 SYRINGE (ML) INJECTION EVERY 6 HOURS PRN
Status: DISCONTINUED | OUTPATIENT
Start: 2024-08-26 | End: 2024-08-29 | Stop reason: HOSPADM

## 2024-08-26 RX ORDER — OXYCODONE HYDROCHLORIDE 5 MG/1
10 TABLET ORAL EVERY 4 HOURS PRN
Status: DISCONTINUED | OUTPATIENT
Start: 2024-08-26 | End: 2024-08-29 | Stop reason: HOSPADM

## 2024-08-26 RX ORDER — CHLORTHALIDONE 25 MG/1
25 TABLET ORAL DAILY
Status: DISCONTINUED | OUTPATIENT
Start: 2024-08-26 | End: 2024-08-29 | Stop reason: HOSPADM

## 2024-08-26 RX ORDER — SODIUM CHLORIDE 9 MG/ML
INJECTION, SOLUTION INTRAVENOUS CONTINUOUS
Status: DISCONTINUED | OUTPATIENT
Start: 2024-08-26 | End: 2024-08-26

## 2024-08-26 RX ORDER — DAPAGLIFLOZIN 5 MG/1
5 TABLET, FILM COATED ORAL DAILY
Status: DISCONTINUED | OUTPATIENT
Start: 2024-08-26 | End: 2024-08-29 | Stop reason: HOSPADM

## 2024-08-26 RX ORDER — CITALOPRAM 20 MG/1
20 TABLET, FILM COATED ORAL DAILY
Status: DISCONTINUED | OUTPATIENT
Start: 2024-08-26 | End: 2024-08-29 | Stop reason: HOSPADM

## 2024-08-26 RX ORDER — POLYETHYLENE GLYCOL 3350 17 G/17G
17 POWDER, FOR SOLUTION ORAL DAILY
Status: DISCONTINUED | OUTPATIENT
Start: 2024-08-26 | End: 2024-08-29 | Stop reason: HOSPADM

## 2024-08-26 RX ADMIN — CEFAZOLIN 2 G: 1 INJECTION, POWDER, FOR SOLUTION INTRAMUSCULAR; INTRAVENOUS; PARENTERAL at 08:08

## 2024-08-26 RX ADMIN — TRANEXAMIC ACID 1000 MG: 100 INJECTION, SOLUTION INTRAVENOUS at 08:08

## 2024-08-26 RX ADMIN — LIDOCAINE HYDROCHLORIDE 100 MG: 20 INJECTION, SOLUTION INTRAVENOUS at 08:08

## 2024-08-26 RX ADMIN — OXYCODONE 10 MG: 5 TABLET ORAL at 12:08

## 2024-08-26 RX ADMIN — CEFAZOLIN 2 G: 2 INJECTION, POWDER, FOR SOLUTION INTRAMUSCULAR; INTRAVENOUS at 12:08

## 2024-08-26 RX ADMIN — LOPERAMIDE HYDROCHLORIDE 4 MG: 2 CAPSULE ORAL at 12:08

## 2024-08-26 RX ADMIN — MUPIROCIN 1 G: 20 OINTMENT TOPICAL at 08:08

## 2024-08-26 RX ADMIN — EPHEDRINE SULFATE 25 MG: 50 INJECTION INTRAVENOUS at 08:08

## 2024-08-26 RX ADMIN — SUGAMMADEX 200 MG: 100 INJECTION, SOLUTION INTRAVENOUS at 09:08

## 2024-08-26 RX ADMIN — INSULIN ASPART 3 UNITS: 100 INJECTION, SOLUTION INTRAVENOUS; SUBCUTANEOUS at 04:08

## 2024-08-26 RX ADMIN — OXYCODONE HYDROCHLORIDE 5 MG: 5 TABLET ORAL at 03:08

## 2024-08-26 RX ADMIN — CHLORTHALIDONE 25 MG: 25 TABLET ORAL at 12:08

## 2024-08-26 RX ADMIN — SODIUM CHLORIDE: 9 INJECTION, SOLUTION INTRAVENOUS at 08:08

## 2024-08-26 RX ADMIN — PREGABALIN 75 MG: 75 CAPSULE ORAL at 08:08

## 2024-08-26 RX ADMIN — DOCUSATE SODIUM 100 MG: 100 CAPSULE, LIQUID FILLED ORAL at 08:08

## 2024-08-26 RX ADMIN — CEFAZOLIN 2 G: 2 INJECTION, POWDER, FOR SOLUTION INTRAMUSCULAR; INTRAVENOUS at 08:08

## 2024-08-26 RX ADMIN — PROPOFOL 120 MG: 10 INJECTION, EMULSION INTRAVENOUS at 08:08

## 2024-08-26 RX ADMIN — ROCURONIUM BROMIDE 50 MG: 10 INJECTION, SOLUTION INTRAVENOUS at 08:08

## 2024-08-26 RX ADMIN — FAMOTIDINE 20 MG: 20 TABLET, FILM COATED ORAL at 08:08

## 2024-08-26 RX ADMIN — MORPHINE SULFATE 3 MG: 4 INJECTION, SOLUTION INTRAMUSCULAR; INTRAVENOUS at 05:08

## 2024-08-26 RX ADMIN — ONDANSETRON 8 MG: 2 INJECTION INTRAMUSCULAR; INTRAVENOUS at 08:08

## 2024-08-26 RX ADMIN — FUROSEMIDE 20 MG: 20 TABLET ORAL at 12:08

## 2024-08-26 RX ADMIN — DOCUSATE SODIUM 100 MG: 100 CAPSULE, LIQUID FILLED ORAL at 12:08

## 2024-08-26 RX ADMIN — DAPAGLIFLOZIN 5 MG: 5 TABLET, FILM COATED ORAL at 11:08

## 2024-08-26 RX ADMIN — POTASSIUM CHLORIDE 20 MEQ: 1500 TABLET, EXTENDED RELEASE ORAL at 12:08

## 2024-08-26 RX ADMIN — ROPIVACAINE HYDROCHLORIDE 10 ML: 7.5 INJECTION, SOLUTION EPIDURAL; PERINEURAL at 07:08

## 2024-08-26 RX ADMIN — CITALOPRAM HYDROBROMIDE 20 MG: 20 TABLET ORAL at 12:08

## 2024-08-26 RX ADMIN — MUPIROCIN 1 G: 20 OINTMENT TOPICAL at 12:08

## 2024-08-26 RX ADMIN — ATENOLOL 50 MG: 25 TABLET ORAL at 12:08

## 2024-08-26 RX ADMIN — SODIUM CHLORIDE: 9 INJECTION, SOLUTION INTRAVENOUS at 12:08

## 2024-08-26 RX ADMIN — VANCOMYCIN HYDROCHLORIDE 1000 MG: 1 INJECTION, POWDER, LYOPHILIZED, FOR SOLUTION INTRAVENOUS at 08:08

## 2024-08-26 RX ADMIN — BUPIVACAINE HYDROCHLORIDE 1.5 ML: 7.5 INJECTION, SOLUTION EPIDURAL; RETROBULBAR at 07:08

## 2024-08-26 RX ADMIN — METFORMIN HYDROCHLORIDE 1000 MG: 500 TABLET ORAL at 04:08

## 2024-08-26 RX ADMIN — OXYCODONE 10 MG: 5 TABLET ORAL at 08:08

## 2024-08-26 RX ADMIN — LISINOPRIL 10 MG: 10 TABLET ORAL at 12:08

## 2024-08-26 RX ADMIN — FAMOTIDINE 20 MG: 20 TABLET, FILM COATED ORAL at 12:08

## 2024-08-26 RX ADMIN — PREGABALIN 75 MG: 75 CAPSULE ORAL at 12:08

## 2024-08-26 NOTE — PLAN OF CARE
Problem: Adult Inpatient Plan of Care  Goal: Plan of Care Review  Outcome: Progressing  Goal: Patient-Specific Goal (Individualized)  Outcome: Progressing  Goal: Absence of Hospital-Acquired Illness or Injury  Outcome: Progressing  Goal: Optimal Comfort and Wellbeing  Outcome: Progressing  Goal: Readiness for Transition of Care  Outcome: Progressing     Problem: Diabetes Comorbidity  Goal: Blood Glucose Level Within Targeted Range  Outcome: Progressing     Problem: Wound  Goal: Optimal Coping  Outcome: Progressing  Goal: Optimal Functional Ability  Outcome: Progressing  Goal: Absence of Infection Signs and Symptoms  Outcome: Progressing  Goal: Improved Oral Intake  Outcome: Progressing  Goal: Optimal Pain Control and Function  Outcome: Progressing  Goal: Skin Health and Integrity  Outcome: Progressing  Goal: Optimal Wound Healing  Outcome: Progressing     Problem: Infection  Goal: Absence of Infection Signs and Symptoms  Outcome: Progressing     Problem: Skin Injury Risk Increased  Goal: Skin Health and Integrity  Outcome: Progressing

## 2024-08-26 NOTE — PLAN OF CARE
Problem: Occupational Therapy  Goal: Occupational Therapy Goal  Description:   STG: (in 1 week)  Pt will bathe with min(A)  with bath sponge  Pt will perform UE dressing with setup  Pt will perform LE dressing with min(A) using reacher and sockaid  Pt will state and adhere to THR precautions during self care and mobility  Pt will t/f bed/chair/bsc with CGA using RW  Perform standing task x 2 min with CGA  using RW  Tolerate 15 min of tx without fatigue.    LTG: in 6 weeks  Restore to max I with selfcare and mobility.     Outcome: Progressing       Pt is post op (R) THR today. Pt was limited in her evaluation today due to low BP and symptomatic orthostatic hypotension post op. RN Kaitlin Gallegos informed. Pt lives with her spouse and would benefit from swingbed at D/C as she is at increased risk for falls.

## 2024-08-26 NOTE — PLAN OF CARE
[facility]  Initial Discharge Assessment       Primary Care Provider: Trang Miller FNP-C    Admission Diagnosis: Osteoarthritis of right hip, unspecified osteoarthritis type [M16.11]  Osteoarthritis of right hip [M16.11]    Admission Date: 8/26/2024  Expected Discharge Date:     Transition of Care Barriers: None    Payor: [employer] MANAGED MEDICARE / Plan: HU[employer] MEDICARE PPO / Product Type: Medicare Advantage /     Extended Emergency Contact Information  Primary Emergency Contact: TyrellColt  Address: [address] [place] [address]  Home Phone: [phone]  Mobile Phone: [phone]  Relation: Spouse  Preferred language: English   needed? No    Discharge Plan A: Skilled Nursing Facility  Discharge Plan B: Home with family, Home Health      [facility] [address] [place] [id number]  Phone: [phone] Fax: [phone]      Initial Assessment (most recent)       Adult Discharge Assessment - 08/26/24 1154          Discharge Assessment    Assessment Type Discharge Planning Assessment     Source of Information family     People in Home spouse     Do you expect to return to your current living situation? Yes     Do you have help at home or someone to help you manage your care at home? Yes     Who are your caregiver(s) and their phone number(s)? Colt Santillan- Spouse     Prior to hospitilization cognitive status: Alert/Oriented     Current cognitive status: Unable to Assess     Walking or Climbing Stairs Difficulty yes     Walking or Climbing Stairs ambulation difficulty, requires equipment     Mobility Management cane     Dressing/Bathing Difficulty no     Home Accessibility stairs to enter home     Number of Stairs, Main Entrance three     Equipment Currently Used at Home cane, straight     Readmission within 30 days? No     Patient currently being followed by outpatient case  management? No     Do you currently have service(s) that help you manage your care at home? No     Do you take prescription medications? Yes     Do you have prescription coverage? Yes     Coverage [employer] Managed Medicare     Do you have any problems affording any of your prescribed medications? No     Is the patient taking medications as prescribed? yes     Who is going to help you get home at discharge? Colt Santillan- Spouse     How do you get to doctors appointments? family or friend will provide     Are you on dialysis? No     Do you take coumadin? No     Discharge Plan A Skilled Nursing Facility     Discharge Plan B Home with family;Home Health     DME Needed Upon Discharge  walker, rolling   Only if insurance denies snf    Discharge Plan discussed with: Spouse/sig other     Name(s) and Number(s) Colt Santillan- Spouse     Transition of Care Barriers None        Physical Activity    On average, how many days per week do you engage in moderate to strenuous exercise (like a brisk walk)? 3 days     On average, how many minutes do you engage in exercise at this level? 10 min        Financial Resource Strain    How hard is it for you to pay for the very basics like food, housing, medical care, and heating? Not very hard        Housing Stability    In the last 12 months, was there a time when you were not able to pay the mortgage or rent on time? No     At any time in the past 12 months, were you homeless or living in a shelter (including now)? No        Transportation Needs    Has the lack of transportation kept you from medical appointments, meetings, work or from getting things needed for daily living? No        Food Insecurity    Within the past 12 months, you worried that your food would run out before you got the money to buy more. Never true     Within the past 12 months, the food you bought just didn't last and you didn't have money to get more. Never true        Stress    Do you feel stress - tense, restless,  nervous, or anxious, or unable to sleep at night because your mind is troubled all the time - these days? Not at all        Social Isolation    How often do you feel lonely or isolated from those around you?  Never        Alcohol Use    Q1: How often do you have a drink containing alcohol? Never     Q2: How many drinks containing alcohol do you have on a typical day when you are drinking? Patient does not drink     Q3: How often do you have six or more drinks on one occasion? Never        Utilities    In the past 12 months has the electric, gas, oil, or water company threatened to shut off services in your home? No        Health Literacy    How often do you need to have someone help you when you read instructions, pamphlets, or other written material from your doctor or pharmacy? Never                   SW spoke with pt at orthopedic meeting prior to admission and pt lives at home with spouse, uses a cane and is not current with . Pt planned to dc to dc home with [facility] when spoken too at orthopedic meeting. Pt not in room at time of initial dcp assessment upon admission, pt in sx. Pt spouse, Colt in room. The dcp has changed and per spouse, pts wants to dc to [facility], choice obtained. MAGGY informed pts spouse that insurance has to approved pt in order for pt to dc to [facility]. MAGGY explained that pt would dc home with [facility] and rw would be ordered if insurance did not approve. Pt voiced understanding. MAGGY faxed referral to [facility]. Pt just getting back to floor from sx not long ago and once pt/ot rikki is in, MAGGY will fax to Carito at [facility]. Per Carito, there are beds available. MD informed that pt will need a tb skin test. I.M not obtained due to pt being OP and SDOH questions completed 5 months ago, SW following.

## 2024-08-26 NOTE — PLAN OF CARE
Problem: Physical Therapy  Goal: Physical Therapy Goal  Description: Short term goals:  Pt will perform supine to sit with minimum assistance  Pt will perform sit to stand with minimum assistance  Pt will independently verbalize hip precautions  Pt will ambulate 50 ft with minimum assistance using rolling walker    Long term goals:  Pt will perform supine to sit with contact guard assistance  Pt will perform sit to stand with contact guard assistance  Pt will ambulate 100 ft with contact guard assistance using rolling walker    Outcome: Progressing

## 2024-08-26 NOTE — ASSESSMENT & PLAN NOTE
Chronic, controlled. Latest blood pressure and vitals reviewed-     Temp:  [97.3 °F (36.3 °C)-98.6 °F (37 °C)]   Pulse:  [65-75]   Resp:  []   BP: (120-177)/(60-86)   SpO2:  [94 %-100 %] .   Home meds for hypertension were reviewed and noted below.   Hypertension Medications               atenoloL-chlorthalidone (TENORETIC) 50-25 mg Tab atenolol 50 mg-chlorthalidone 25 mg tablet   TAKE 1 TABLET BY MOUTH ONCE DAILY    furosemide (LASIX) 20 MG tablet furosemide 20 mg tablet   TAKE 1 TABLET BY MOUTH ONCE DAILY    lisinopriL 10 MG tablet lisinopril 10 mg tablet   Take 1 tablet by mouth once daily            While in the hospital, will manage blood pressure as follows; Continue home antihypertensive regimen    Will utilize p.r.n. blood pressure medication only if patient's blood pressure greater than 160/100 and she develops symptoms such as worsening chest pain or shortness of breath.

## 2024-08-26 NOTE — ANESTHESIA PROCEDURE NOTES
Intubation    Date/Time: 8/26/2024 8:13 AM    Performed by: Neymar Nunez CRNA  Authorized by: Obinna Concepcion DO    Intubation:     Induction:  Intravenous    Intubated:  Postinduction    Mask Ventilation:  Easy mask    Attempts:  1    Attempted By:  CRNA    Method of Intubation:  Direct    Blade:  Esteban 4    Laryngeal View Grade: Grade I - full view of cords      Difficult Airway Encountered?: No      Complications:  None    Airway Device:  Oral endotracheal tube    Airway Device Size:  7.0    Style/Cuff Inflation:  Cuffed    Inflation Amount (mL):  7    Tube secured:  22    Secured at:  The lips    Placement Verified By:  Capnometry    Complicating Factors:  None    Findings Post-Intubation:  BS equal bilateral and atraumatic/condition of teeth unchanged

## 2024-08-26 NOTE — ANESTHESIA PREPROCEDURE EVALUATION
08/26/2024  January Santillan is a 69 y.o., female.      Pre-op Assessment    I have reviewed the Patient Summary Reports.     I have reviewed the Nursing Notes. I have reviewed the NPO Status.   I have reviewed the Medications.     Review of Systems  Anesthesia Hx:  No problems with previous Anesthesia             Denies Family Hx of Anesthesia complications.    Denies Personal Hx of Anesthesia complications.                    Social:  Non-Smoker, No Alcohol Use       Cardiovascular:  Exercise tolerance: good   Hypertension           hyperlipidemia   ECG has been reviewed. Seen by medicine service, Dr. Cuevas, for preop risk stratification and optimization.    RCRI class 1                         Hepatic/GI:     GERD             Musculoskeletal:  Arthritis               Endocrine:  Diabetes, well controlled, type 2             Past Medical History:   Diagnosis Date    Diabetes mellitus, type 2     Hypertension      Past Surgical History:   Procedure Laterality Date    HAND SURGERY      SHOULDER ARTHROSCOPY      TUBAL LIGATION           Physical Exam  General: Well nourished, Cooperative and Alert    Airway:  Mallampati: II   Mouth Opening: Normal  TM Distance: Normal  Tongue: Normal  Neck ROM: Normal ROM    Chest/Lungs:  Clear to auscultation, Normal Respiratory Rate    Heart:  Rate: Normal  Rhythm: Regular Rhythm        Chemistry        Component Value Date/Time     (L) 08/21/2024 1218    K 4.8 08/21/2024 1218     08/21/2024 1218    CO2 27 08/21/2024 1218    BUN 26 (H) 08/21/2024 1218    CREATININE 1.44 (H) 08/21/2024 1218    GLU 71 (L) 08/21/2024 1218        Component Value Date/Time    CALCIUM 9.4 08/21/2024 1218    ALKPHOS 97 08/21/2024 1218    AST 21 08/21/2024 1218    ALT 23 08/21/2024 1218    BILITOT 0.4 08/21/2024 1218        Lab Results   Component Value Date    WBC 6.04 08/21/2024    HGB 13.1  08/21/2024    HCT 43.4 08/21/2024     08/21/2024       EKG - NSR      Anesthesia Plan  Type of Anesthesia, risks & benefits discussed:    Anesthesia Type: Gen ETT, Spinal, Regional  Intra-op Monitoring Plan: Standard ASA Monitors  Post Op Pain Control Plan: multimodal analgesia  Induction:  IV  Airway Plan: Direct, Post-Induction  Informed Consent: Informed consent signed with the Patient and all parties understand the risks and agree with anesthesia plan.  All questions answered.   ASA Score: 3  Day of Surgery Review of History & Physical: H&P Update referred to the surgeon/provider.I have interviewed and examined the patient. I have reviewed the patient's H&P dated: There are no significant changes.     Ready For Surgery From Anesthesia Perspective.     .

## 2024-08-26 NOTE — PT/OT/SLP EVAL
Physical Therapy Evaluation     Patient Name: January Santillan   MRN: [id number]  Recent Surgery: Procedure(s) (LRB):  ROBOTIC ARTHROPLASTY,HIP (Right) Day of Surgery    Recommendations:     Discharge Recommendations: High Intensity Therapy   Discharge Equipment Recommendations: walker, rolling   Barriers to discharge: Increased level of assist and Ongoing medical treatment    Assessment:     January Santillan is a 69 y.o. female admitted with a medical diagnosis of Osteoarthritis of right hip. She presents with the following impairments/functional limitations: weakness, impaired functional mobility, impaired endurance, decreased lower extremity function, gait instability, pain, orthopedic precautions. Pt has increased pain post op. /65 at rest, dropped to 59/44 when sitting at edge of bed. Unable to attempt ambulation due to hypotension. Pt hopeful to go to swingbed prior to return home    Rehab Prognosis: Good; patient would benefit from acute PT services to address these deficits and reach maximum level of function.    Plan:     During this hospitalization, patient to be seen BID (5x/wk, daily 2x/wk) to address the above listed problems via gait training, therapeutic activities, therapeutic exercises    Plan of Care Expires: 09/26/24    Subjective     Chief Complaint: right hip pain  Patient Comments/Goals: Pt is agreeable to PT   Pain/Comfort:  Pain Rating 1: 10/10  Location 1: hip  Pain Rating Post-Intervention 1: 10/10    Social History:  Living Environment: Patient lives with their spouse in a single story home with number of outside stair(s): 3 with rail  Prior Level of Function: Prior to admission, patient was modified independent with ADLs using straight cane for mobility  Equipment Used at Home: cane, straight  DME owned (not currently used): rollator  Assistance Upon Discharge: family    Objective:     Communicated with ASPEN Gallegos RN prior to session. Patient found HOB elevated with peripheral IV, hip  abduction pillow, blood pressure cuff, SCD upon PT entry to room.    General Precautions: Standard, fall   Orthopedic Precautions: RLE posterior precautions, RLE weight bearing as tolerated   Braces: N/A    Respiratory Status: Room air    Exams:  Cognition: Patient is oriented to Person, Place, Time, Situation  RLE ROM:  limited by pain and hip precautions  RLE Strength:  unable to assess  LLE ROM: WFL  LLE Strength: WFL  Gross Motor Coordination: WFL    Functional Mobility:  Gait belt applied - N/A  Bed Mobility  Rolling Left: minimum assistance  Rolling Right: minimum assistance  Scooting: moderate assistance  Supine to Sit: moderate assistance and of 2 persons for LE management and trunk management  Sit to Supine: moderate assistance and of 2 persons for LE management and trunk management  Transfers  Unable   Gait  Unable to assess  Balance  Sitting: contact guard assistance  Standing:  n/a      Therapeutic Activities and Exercises:   Patient educated on role of acute care PT and PT POC, safety while in hospital including calling nurse for mobility, and call light usage      AM-PAC 6 CLICK MOBILITY  Total Score:10    Patient left HOB elevated with all lines intact and call button in reach.    GOALS:   Multidisciplinary Problems       Physical Therapy Goals          Problem: Physical Therapy    Goal Priority Disciplines Outcome Goal Variances Interventions   Physical Therapy Goal     PT, PT/OT Progressing     Description: Short term goals:  Pt will perform supine to sit with minimum assistance  Pt will perform sit to stand with minimum assistance  Pt will independently verbalize hip precautions  Pt will ambulate 50 ft with minimum assistance using rolling walker    Long term goals:  Pt will perform supine to sit with contact guard assistance  Pt will perform sit to stand with contact guard assistance  Pt will ambulate 100 ft with contact guard assistance using rolling walker                         History:      Past Medical History:   Diagnosis Date    Diabetes mellitus, type 2     Hypertension        Past Surgical History:   Procedure Laterality Date    HAND SURGERY      SHOULDER ARTHROSCOPY      TUBAL LIGATION         Time Tracking:     PT Received On: 08/26/24  PT Start Time: 1347  PT Stop Time: 1411  PT Total Time (min): 24 min     Billable Minutes: Evaluation moderate complexity    8/26/2024  Clinical Information for Insurance Authorization     Dates of Services: 8/26/2024 to 9/9/2024  Discharge Plan: Patient will be discharged from skilled physical therapy treatment once all goals have been met, patient has plateaued, or physician/insurance requests discontinuation of care. Patient will be discharged with a home exercise program.   Type of therapy: Rehabilitative  ICD-10 Diagnosis Code(s): z96.641  Which side is symptomatic? Right  Surgical: Yes  Surgical procedure:  right total hip replacement   Surgery date:  8/26/2024 .  Presenting symptoms/diagnoses are traumatic in nature.  Presenting symptoms are non-radiating in nature.   The rehabilitation is not related to a diagnosis of cancer.  The rehabilitation is not related to a diagnosis of lymphedema.  Patient's clinical presentation is:  Moderate objective and functional deficits: consistent symptoms and/or symptoms that are intensified with activity with moderate loss of range of motion, strength, or ability to perform daily tasks  CPT Codes Requested:  43382 [therapeutic exercise], 24778 [gait training], and 97259 [therapeutic activities]

## 2024-08-26 NOTE — ANESTHESIA PROCEDURE NOTES
Spinal    Diagnosis: right LUCILLE  Patient location during procedure: OR  Start time: 8/26/2024 7:35 AM  Timeout: 8/26/2024 7:35 AM  End time: 8/26/2024 7:45 AM    Staffing  Authorizing Provider: Obinna Concepcion DO  Performing Provider: Obinna Concepcion DO    Staffing  Performed by: Obinna Concepcion DO  Authorized by: Obinna Concepcion DO    Preanesthetic Checklist  Completed: patient identified, IV checked, site marked, risks and benefits discussed, surgical consent, monitors and equipment checked, pre-op evaluation and timeout performed  Spinal Block  Patient position: sitting  Prep: ChloraPrep  Patient monitoring: cardiac monitor, continuous pulse ox and frequent blood pressure checks  Approach: midline  Location: L4-5  Injection technique: single shot  CSF Fluid: clear free-flowing CSF  Needle  Needle type: Quincke   Needle gauge: 22 G  Needle length: 3.5 in  Additional Documentation: incremental injection, negative aspiration for heme and no paresthesia on injection  Needle localization: anatomical landmarks  Assessment  Ease of block: difficult  Patient's tolerance of the procedure: comfortable throughout block  Medications:    Medications: BUPivacaine (pf) (MARCAINE) injection 0.75% - Intraspinal   1.5 mL - 8/26/2024 7:45:00 AM

## 2024-08-26 NOTE — HPI
Ms. January Santillan is a 70yo woman history of DM2, HTN, HLD, gastric ulcer, GERD who presents with progressive R hip degenerative joint disease that has failed conservative care.  She is now s/p R hip repair.  Post-operative course was unremarkable.  She denies symptoms at this time.  She states she would like to go to swing-[facility] if possible.

## 2024-08-26 NOTE — ASSESSMENT & PLAN NOTE
"Patient's FSGs are controlled on current medication regimen.  Last A1c reviewed- No results found for: "LABA1C", "HGBA1C"  Most recent fingerstick glucose reviewed- No results for input(s): "POCTGLUCOSE" in the last 24 hours.  Current correctional scale  Low  Maintain anti-hyperglycemic dose as follows-   Antihyperglycemics (From admission, onward)      Start     Stop Route Frequency Ordered    08/26/24 1715  metFORMIN tablet 1,000 mg         -- Oral 2 times daily with meals 08/26/24 1130    08/26/24 1145  dapagliflozin propanediol tablet 5 mg        Question Answer Comment   Does this patient have a diagnosis of heart failure? Yes    Does this patient have type 1 diabetes or diabetic ketoacidosis? No    Does this patient have symptomatic hypotension? No    Is the patient NPO or pending major surgery in next 3 days or less? No        -- Oral Daily 08/26/24 1130    08/26/24 1145  liraglutide 0.6 mg/0.1 mL (18 mg/3 mL) subq PNIJ pen 0.6 mg         -- SubQ Daily 08/26/24 1130          Hold Oral hypoglycemics while patient is in the hospital.  "

## 2024-08-26 NOTE — ASSESSMENT & PLAN NOTE
S/p R hip repair.      Now on  per ortho.    Monitor given history of peptic ulcer.       Billing Type: Third-Party Bill

## 2024-08-26 NOTE — TRANSFER OF CARE
"Anesthesia Transfer of Care Note    Patient: January Santillan    Procedure(s) Performed: Procedure(s) (LRB):  ROBOTIC ARTHROPLASTY,HIP (Right)    Patient location: PACU    Anesthesia Type: general    Transport from OR: Transported from OR on 100% O2 by closed face mask with adequate spontaneous ventilation    Post pain: adequate analgesia    Post assessment: no apparent anesthetic complications    Post vital signs: stable    Level of consciousness: responds to stimulation    Nausea/Vomiting: no nausea/vomiting    Complications: none    Transfer of care protocol was followed      Last vitals: Visit Vitals  BP (!) 166/73 (BP Location: Right arm, Patient Position: Lying)   Pulse 70   Temp 37 °C (98.6 °F) (Oral)   Resp 18   Ht 5' 2" (1.575 m)   Wt 53.5 kg (118 lb)   SpO2 100%   Breastfeeding No   BMI 21.58 kg/m²     "

## 2024-08-26 NOTE — OP NOTE
[facility]  OPERATIVE REPORT   ORTHOPAEDIC SURGERY   PROVIDER: DR. Evert López    PATIENT INFORMATION   January Santillan 69 y.o. female 1955   MRN: [id number]       DATE OF PROCEDURE: 8/26/2024     PREOPERATIVE DIAGNOSES:   Osteoarthritis of right hip, unspecified osteoarthritis type [M16.11]    POSTOPERATIVE DIAGNOSES:   Osteoarthritis of right hip, unspecified osteoarthritis type [M16.11]    PROCEDURES PERFORMED:   Procedure(s) (LRB):  ROBOTIC ARTHROPLASTY,HIP (Right)    Surgeons and Role:     * Evert López MD - Primary    ANESTHESIA:  General plus spinal plus fascia iliacus block    ESTIMATED BLOOD LOSS: less than 50 mL    IMPLANTS:   Implant Name Type Inv. Item Serial No.  Lot No. LRB No. Used Action   PIN BONE 4 X 140MM STERILE - [id number]  PIN BONE 4 X 140MM STERILE  [employer]  Right 1 Implanted and Explanted   PIN BONE 4 X 140MM STERILE - [id number]  PIN BONE 4 X 140MM STERILE  [employer]  Right 1 Implanted and Explanted   TRIDENT 2 CLUSTERHOLE HA ACETABULAR SHELL      Right 1 Implanted   INSERT TRIDENT X3 0 DEG 36MM D - [id number]  INSERT TRIDENT X3 0 DEG 36MM D  [employer]ANC[employer]. [id number] Right 1 Implanted   SCREW TRIDENT II LP HEX 6.5X35 - [id number]  SCREW TRIDENT II LP HEX 6.5X35  [employer]ANC[employer]. [id number] Right 1 Implanted   STEM FEM ACCOLADEII 132DEG SZ3 - [id number]  STEM FEM ACCOLADEII 132DEG SZ3  [employer]ANCY [employer]. [id number] Right 1 Implanted   HEAD FEM V40 36MM +2.5MM BIO - [id number]  HEAD FEM V40 36MM +2.5MM BIO  [employer]ANCY [employer]. [id number] Right 1 Implanted        FINDINGS:  Severe right hip osteoarthritis     SPECIMENS:   Specimen (24h ago, onward)       Start     Ordered    08/26/24 0855  Surgical Pathology  RELEASE UPON ORDERING         08/26/24 0855                    COMPLICATIONS: None.     INTRAOPERATIVE COUNTS: Correct.     PROPHYLACTIC IV ANTIBIOTICS: Given per   Protocol.    INDICATIONS FOR PROCEDURE:  The patient is a 69 y.o. female with right  hip osteoarthritis Treatment options were discussed and total hip arthroplasty was elected..  Risks and complications were discussed including, but not limited to the risks of anesthetic complications, infections, wound healing complications, aseptic loosening, instability, DVT, pulmonary embolism and death among others.    DESCRIPTION OF PROCEDURE:  The patient was taken to the Operating Room where anesthesia was administered by the Anesthesia Department. She was then placed in the lateral decubitus position.The right hip and lower extremity were then sterilely prepped and draped in the normal fashion. Preoperative antibiotics were given.  1 g of TXA was given prior to the incision.      3 pins for the array were placed percutaneously at the iliac crest. Care was taken when placing these not to damage any soft tissue. We were very satisfied with   their position and fixation.  The array was completely assembled at this point.  A nonsterile EKG lead had been placed on the patella prior to prepping, and this was used as a reference point during the case.     A 15 cm curvilinear incision was made from the base of the greater trochanter extending proximally and posteriorly.  Subcutaneous tissue was dissected with electrocautery down to the deep fascia, which was incised along the line of the incision.  The gluteus rosa maria was then split along the line of its fibers.  The abductor musculature was retracted anteriorly. The piriformis was tagged for later repair with a #1 Vicryl and reflected from its insertion on the greater trochanter. An array was placed on the greater trochanter.  Leg lengths were established via the robotic navigation.  The other short external rotators were then released in an L shaped fashion. to reveal the underlying hip capsule.  A capsulotomy was made along the base of the femoral neck and extended towards the  "acetabulum slightly "L" fashion.  The femoral head was dislocated at this point.      Femoral neck cut was made. Femoral head was removed. The acetabulum was then exposed with the use of retractors. The reflected head of the rectus and anterior capsule was released.   The sciatic nerve was palpated; it was out of harm's way. Excellent exposure of the acetabulum was obtained, and the labrum was excised. At this point, the   Then the acetabular checkpoint was placed. At this point, we were satisfied with our setup. We confirmed the position of the checkpoint and then registered our acetabular anatomy and landmarks. Once this was accomplished, we brought the AYESHA into the field and with a goal of 45  degrees of abduction and 20 degrees of  anteversion, we reamed the acetabulum. We were very satisfied with the reaming. The reamer was then removed.  We then used the cup  with the AYESHA and impacted the cup.   Position was confirmed at 45 of abduction and 20 of anteversion. We did augment with one 6.5 bone screw and then the liner was firmly seated.  All acetabular retractors were raised and checkpoints were removed.       We then turned our attention towards proximal femoral preparation.  A box chisel was used to gain access to intramedullary canal followed by straight reamer, lateralizing reamer and then further enlarging straight reamers. Appropriate sized broaches were placed.  The selected broach had excellent fit. A calcar planer was then used to plane the calcar.  The head was applied and the hip was reduced.  Excellent stability was noted throughout the range of motion and leg lengths were satisfactory.    Trial components were then removed.  The press fit stem was then impacted and excellent stability was noted.  The head was then applied and the hip was again reduced.  Again, excellent stability and satisfactory leg lengths were noted.    Wounds once again thoroughly irrigated.  The capsule and short " external rotators were repaired with  #2 Fiberwire after the creation of two bone tunnels in the greater trochanter.  This served to anatomically reduce the short external rotators. The gluteal fascia and fascia byron were then closed with interrupted figure-of-eight sutures of #1 Vicryl.  The deep subcutaneous tissue was closed with a running stitch of 0 Vicryl.  Superficial subcutaneous tissue was closed with interrupted inverted sutures of #2-0 Vicryl.  Skin was approximated using skin staples.  Sterile dressing was applied.  Anesthesia was reversed and she was returned to the Postanesthesia Care Unit in stable condition.    Recommend Posterior Hip Precautions

## 2024-08-26 NOTE — SUBJECTIVE & OBJECTIVE
Past Medical History:   Diagnosis Date    Diabetes mellitus, type 2     Hypertension        Past Surgical History:   Procedure Laterality Date    HAND SURGERY      SHOULDER ARTHROSCOPY      TUBAL LIGATION         Review of patient's allergies indicates:   Allergen Reactions    Codeine     Tylenol [acetaminophen]        No current facility-administered medications on file prior to encounter.     Current Outpatient Medications on File Prior to Encounter   Medication Sig    atenoloL-chlorthalidone (TENORETIC) 50-25 mg Tab atenolol 50 mg-chlorthalidone 25 mg tablet   TAKE 1 TABLET BY MOUTH ONCE DAILY    lisinopriL 10 MG tablet lisinopril 10 mg tablet   Take 1 tablet by mouth once daily    alendronate (FOSAMAX) 70 MG tablet alendronate 70 mg tablet   TAKE 1 TABLET BY MOUTH ONCE A WEEK    allopurinoL (ZYLOPRIM) 100 MG tablet allopurinol 100 mg tablet   TAKE 1 TABLET BY MOUTH ONCE DAILY (Patient not taking: Reported on 8/21/2024)    baclofen (LIORESAL) 10 MG tablet baclofen 10 mg tablet   Take 1 tablet 4 times a day by oral route as needed. (Patient not taking: Reported on 8/21/2024)    citalopram (CELEXA) 20 MG tablet citalopram 20 mg tablet   TAKE 1 TABLET BY MOUTH ONCE DAILY AT BEDTIME    dexamethasone (DECADRON) 4 mg/mL injection 4 mg. (Patient not taking: Reported on 8/21/2024)    furosemide (LASIX) 20 MG tablet furosemide 20 mg tablet   TAKE 1 TABLET BY MOUTH ONCE DAILY    gabapentin (NEURONTIN) 600 MG tablet gabapentin 600 mg tablet   TAKE 0.5 TABLET BY MOUTH IN AM AND 1 TABLET BY MOUTH IN PM    hydrOXYzine (ATARAX) 50 MG tablet hydroxyzine HCl 50 mg tablet   TAKE 1 TABLET BY MOUTH AS NEEDED AT BEDTIME    ketorolac (TORADOL) 60 mg/2 mL Soln 60 mg. (Patient not taking: Reported on 8/21/2024)    magnesium chloride (SLOW-MAG) 71.5 mg TbEC Slow-Mag 71.5 mg tablet,delayed release   Take 1 tablet twice a day by oral route.    metFORMIN (GLUCOPHAGE) 1000 MG tablet metformin 1,000 mg tablet   TAKE 1 TABLET BY MOUTH TWICE  DAILY    methylPREDNISolone acetate (DEPO-MEDROL) 40 mg/mL injection 40 mg. (Patient not taking: Reported on 8/21/2024)    naproxen (NAPROSYN) 500 MG tablet Take 1 tablet (500 mg total) by mouth 2 (two) times daily with meals.    omeprazole (PRILOSEC) 20 MG capsule Take 20 mg by mouth once daily.    potassium chloride SA (K-DUR,KLOR-CON) 20 MEQ tablet potassium chloride ER 20 mEq tablet,extended release(part/cryst)   TAKE 1 TABLET BY MOUTH DAILY    simvastatin (ZOCOR) 40 MG tablet simvastatin 40 mg tablet   TAKE 1 TABLET BY MOUTH ONCE DAILY    traMADoL (ULTRAM) 50 mg tablet tramadol 50 mg tablet   TAKE 1 TABLET BY MOUTH EVERY 12 HOURS AS NEEDED FOR PAIN     Family History       Problem Relation (Age of Onset)    Diabetes Mother, Sister, Brother    Hypertension Mother, Father          Tobacco Use    Smoking status: Never    Smokeless tobacco: Never   Substance and Sexual Activity    Alcohol use: Never    Drug use: Never    Sexual activity: Not on file     Review of Systems   Constitutional:  Negative for activity change, chills, fatigue and fever.   HENT:  Negative for congestion and sore throat.    Respiratory:  Negative for chest tightness.    Gastrointestinal:  Negative for abdominal distention, abdominal pain and diarrhea.   Endocrine: Negative for cold intolerance and heat intolerance.   Genitourinary:  Negative for dysuria.   Musculoskeletal:  Negative for arthralgias and back pain.        R hip pain     Skin:  Negative for color change and rash.   Neurological:  Negative for dizziness, tremors and headaches.   Psychiatric/Behavioral:  Negative for agitation. The patient is not nervous/anxious.      Objective:     Vital Signs (Most Recent):  Temp: 97.3 °F (36.3 °C) (08/26/24 1046)  Pulse: 69 (08/26/24 1200)  Resp: 20 (08/26/24 1201)  BP: (!) 157/72 (08/26/24 1146)  SpO2: 100 % (08/26/24 1200) Vital Signs (24h Range):  Temp:  [97.3 °F (36.3 °C)-98.6 °F (37 °C)] 97.3 °F (36.3 °C)  Pulse:  [65-75] 69  Resp:   [] 20  SpO2:  [94 %-100 %] 100 %  BP: (120-177)/(60-86) 157/72     Weight: 64.3 kg (141 lb 12.1 oz)  Body mass index is 25.93 kg/m².     Physical Exam  Constitutional:       Appearance: Normal appearance.   HENT:      Head: Normocephalic and atraumatic.      Nose: Nose normal.      Mouth/Throat:      Mouth: Mucous membranes are moist.      Pharynx: Oropharynx is clear.   Eyes:      Extraocular Movements: Extraocular movements intact.      Conjunctiva/sclera: Conjunctivae normal.      Pupils: Pupils are equal, round, and reactive to light.   Cardiovascular:      Rate and Rhythm: Normal rate and regular rhythm.      Pulses: Normal pulses.      Heart sounds: Normal heart sounds.   Pulmonary:      Effort: Pulmonary effort is normal.      Breath sounds: Normal breath sounds.   Abdominal:      General: Abdomen is flat. Bowel sounds are normal.      Palpations: Abdomen is soft.   Musculoskeletal:         General: Normal range of motion.      Cervical back: Normal range of motion and neck supple.      Comments: R hip post-operative changes     Skin:     General: Skin is warm and dry.   Neurological:      General: No focal deficit present.      Mental Status: She is alert and oriented to person, place, and time.   Psychiatric:         Mood and Affect: Mood normal.         Behavior: Behavior normal.      Significant Labs: All pertinent labs within the past 24 hours have been reviewed.    Significant Imaging: I have reviewed all pertinent imaging results/findings within the past 24 hours.

## 2024-08-26 NOTE — ANESTHESIA PROCEDURE NOTES
Peripheral Block    Patient location during procedure: OR   Block not for primary anesthetic.  Reason for block: at surgeon's request and post-op pain management   Post-op Pain Location: right hip   Start time: 8/26/2024 7:47 AM  Timeout: 8/26/2024 7:45 AM   End time: 8/26/2024 7:52 AM    Staffing  Authorizing Provider: Obinna Concepcion DO  Performing Provider: Obinna Concepcion DO    Staffing  Performed by: Obinna Concepcion DO  Authorized by: Obinna Concepcion DO    Preanesthetic Checklist  Completed: patient identified, IV checked, site marked, risks and benefits discussed, surgical consent, monitors and equipment checked, pre-op evaluation and timeout performed  Peripheral Block  Patient position: supine  Prep: ChloraPrep  Patient monitoring: cardiac monitor, continuous pulse ox and frequent blood pressure checks  Block type: fascia iliaca  Laterality: right  Injection technique: single shot  Needle  Needle type: Stimuplex   Needle gauge: 20 G  Needle length: 4 in  Needle localization: anatomical landmarks and ultrasound guidance   -ultrasound image captured on disc.  Assessment  Injection assessment: negative aspiration and local visualized surrounding nerve  Paresthesia pain: none  Heart rate change: no  Slow fractionated injection: yes  Pain Tolerance: comfortable throughout block  Medications:    Medications: ROPIvacaine (NAROPIN) injection 0.75% - Perineural   10 mL - 8/26/2024 7:47:00 AM    Additional Notes  0.75% naropin diluted to 0.25% with 30cc total volume given

## 2024-08-26 NOTE — ANESTHESIA POSTPROCEDURE EVALUATION
Anesthesia Post Evaluation    Patient: January Santillan    Procedure(s) Performed: Procedure(s) (LRB):  ROBOTIC ARTHROPLASTY,HIP (Right)    Final Anesthesia Type: general      Patient location during evaluation: PACU  Patient participation: Yes- Able to Participate  Level of consciousness: awake and alert and oriented  Post-procedure vital signs: reviewed and stable  Pain management: adequate  Airway patency: patent  CIPRIANO mitigation strategies: Multimodal analgesia and Use of major conduction anesthesia (spinal/epidural) or peripheral nerve block  PONV status at discharge: No PONV  Anesthetic complications: no      Cardiovascular status: hemodynamically stable  Respiratory status: unassisted and spontaneous ventilation  Hydration status: euvolemic  Follow-up not needed.              Vitals Value Taken Time   /67 08/26/24 1021   Temp 37 °C (98.6 °F) 08/26/24 1002   Pulse 66 08/26/24 1023   Resp 18 08/26/24 1002   SpO2 97 % 08/26/24 1023   Vitals shown include unfiled device data.      No case tracking events are documented in the log.      Pain/Shanon Score: Shanon Score: 8 (8/26/2024  9:59 AM)

## 2024-08-26 NOTE — PT/OT/SLP EVAL
Occupational Therapy   Evaluation    Name: January Santillan  MRN: [id number]  Admitting Diagnosis: Osteoarthritis of right hip  Recent Surgery: Procedure(s) (LRB):  ROBOTIC ARTHROPLASTY,HIP (Right) Day of Surgery    Recommendations:     Discharge Recommendations: High Intensity Therapy  Discharge Equipment Recommendations:  walker, rolling  Barriers to discharge:       Assessment:     January Santillan is a 69 y.o. female with a medical diagnosis of Osteoarthritis of right hip.  She presents with alert and c/o (R) hip pain. Performance deficits affecting function: weakness, impaired endurance, impaired self care skills, impaired functional mobility, pain, orthopedic precautions.      Pt is post op (R) THR today. Pt was limited in her evaluation today due to low BP and symptomatic orthostatic hypotension post op. RN Kaitlin Gallegos informed. Pt lives with her spouse and would benefit from swingbed at D/C as she is at increased risk for falls.     Rehab Prognosis: Good; patient would benefit from acute skilled OT services to address these deficits and reach maximum level of function.       Plan:     Patient to be seen 5 x/week to address the above listed problems via self-care/home management, therapeutic activities, therapeutic exercises  Plan of Care Expires: 09/30/24  Plan of Care Reviewed with: patient, spouse    Subjective     Chief Complaint: Osteoarthritis of right hip    Patient/Family Comments/goals: Pt plans to go to swingbed at discharge    Occupational Profile:  Living Environment: Pt lives in a single level home with 3 steps and a rail to enter. Pt has a tub shower with grab bars.  Previous level of function: Pt was able to ambulate with a rollator and perform her self care. Until a month or 2 ago, pt was able to perform homemaking as well.  Roles and Routines: Pt is , takes care of her home as she is able and takes care of herself.  Equipment Used at Home: cane, straight  Assistance upon Discharge: Pt will  have assistance from facility staff    Pain/Comfort:  Pain Rating 1: 10/10  Location - Side 1: Right  Location 1: hip  Pain Addressed 1: Pre-medicate for activity, Reposition, Cessation of Activity  Pain Rating Post-Intervention 1: 10/10    Patients cultural, spiritual, Mosque conflicts given the current situation: no    Objective:     Communicated with: TALIA Gallegos prior to session.  Patient found HOB elevated with peripheral IV, hip abduction pillow, blood pressure cuff, SCD upon OT entry to room.    General Precautions: Standard, fall  Orthopedic Precautions: RLE weight bearing as tolerated, RLE posterior precautions  Braces: N/A  Respiratory Status: Room air    Occupational Performance:    Bed Mobility:    Patient completed Rolling/Turning to Left with  moderate assistance  Patient completed Rolling/Turning to Right with moderate assistance  Patient completed Supine to Sit with moderate assistance  Patient completed Sit to Supine with moderate assistance    Functional Mobility/Transfers:  Pt unable. Pt was very dizzy and blood pressure dropped to 59/44 with sitting.    Activities of Daily Living:  Unable to test due to dizziness and blood pressure being low.    Cognitive/Visual Perceptual:  Cognitive/Psychosocial Skills:     -       Oriented to: Person, Place, and Situation   -       Follows Commands/attention:Follows one-step commands  -       Communication: clear/fluent  -       Safety awareness/insight to disability: intact   -       Mood/Affect/Coping skills/emotional control: Cooperative  Visual/Perceptual:      -wears glasses      Physical Exam:  Balance:    -       sitting with CGA/ SBA  Dominant hand:    -       right  Upper Extremity Range of Motion:     -       Right Upper Extremity: WFL  -       Left Upper Extremity: WFL  Upper Extremity Strength:    -       Right Upper Extremity: WFL  -       Left Upper Extremity: WFL   Strength:    -       Right Upper Extremity: WFL  -       Left  Upper Extremity: WFL  Fine Motor Coordination:    -       Intact  Gross motor coordination:   WFL    AMPAC 6 Click ADL:  AMPAC Total Score: 19    Treatment & Education:  Pt educated on OT role/POC.   Importance of OOB activity with staff assistance.  Importance of sitting up in the chair throughout the day as tolerated, especially for meals   Importance of assisting with self-care activities   All questions/concerns answered within OT scope of practice  THR posterior precautions       Patient left HOB elevated with all lines intact, call button in reach, and RN Kaitlin and pt's spouse present    GOALS:   Multidisciplinary Problems       Occupational Therapy Goals          Problem: Occupational Therapy    Goal Priority Disciplines Outcome Interventions   Occupational Therapy Goal     OT, PT/OT Progressing    Description:   STG: (in 1 week)  Pt will bathe with min(A)  with bath sponge  Pt will perform UE dressing with setup  Pt will perform LE dressing with min(A) using reacher and sockaid  Pt will state and adhere to THR precautions during self care and mobility  Pt will t/f bed/chair/bsc with CGA using RW  Perform standing task x 2 min with CGA  using RW  Tolerate 15 min of tx without fatigue.    LTG: in 6 weeks  Restore to max I with selfcare and mobility.                          History:     Past Medical History:   Diagnosis Date    Diabetes mellitus, type 2     Hypertension          Past Surgical History:   Procedure Laterality Date    HAND SURGERY      SHOULDER ARTHROSCOPY      TUBAL LIGATION         Time Tracking:     OT Date of Treatment: 08/26/24  OT Start Time: 1347  OT Stop Time: 1411  OT Total Time (min): 24 min    Billable Minutes:Evaluation low complexity    8/26/2024

## 2024-08-27 LAB
ANION GAP SERPL CALCULATED.3IONS-SCNC: 12 MMOL/L (ref 7–16)
BASOPHILS # BLD AUTO: 0.04 K/UL (ref 0–0.2)
BASOPHILS NFR BLD AUTO: 0.4 % (ref 0–1)
BUN SERPL-MCNC: 27 MG/DL (ref 7–18)
BUN/CREAT SERPL: 14 (ref 6–20)
CALCIUM SERPL-MCNC: 8.4 MG/DL (ref 8.5–10.1)
CHLORIDE SERPL-SCNC: 103 MMOL/L (ref 98–107)
CO2 SERPL-SCNC: 26 MMOL/L (ref 21–32)
CREAT SERPL-MCNC: 1.97 MG/DL (ref 0.55–1.02)
DIFFERENTIAL METHOD BLD: ABNORMAL
EGFR (NO RACE VARIABLE) (RUSH/TITUS): 27 ML/MIN/1.73M2
EOSINOPHIL # BLD AUTO: 0.01 K/UL (ref 0–0.5)
EOSINOPHIL NFR BLD AUTO: 0.1 % (ref 1–4)
ERYTHROCYTE [DISTWIDTH] IN BLOOD BY AUTOMATED COUNT: 13.8 % (ref 11.5–14.5)
GLUCOSE SERPL-MCNC: 123 MG/DL (ref 70–105)
GLUCOSE SERPL-MCNC: 129 MG/DL (ref 70–105)
GLUCOSE SERPL-MCNC: 189 MG/DL (ref 70–105)
GLUCOSE SERPL-MCNC: 198 MG/DL (ref 74–106)
GLUCOSE SERPL-MCNC: 205 MG/DL (ref 70–105)
GLUCOSE SERPL-MCNC: 208 MG/DL (ref 70–105)
HCT VFR BLD AUTO: 30 % (ref 38–47)
HGB BLD-MCNC: 9.6 G/DL (ref 12–16)
IMM GRANULOCYTES # BLD AUTO: 0.03 K/UL (ref 0–0.04)
IMM GRANULOCYTES NFR BLD: 0.3 % (ref 0–0.4)
LYMPHOCYTES # BLD AUTO: 1.31 K/UL (ref 1–4.8)
LYMPHOCYTES NFR BLD AUTO: 13.6 % (ref 27–41)
MCH RBC QN AUTO: 29.5 PG (ref 27–31)
MCHC RBC AUTO-ENTMCNC: 32 G/DL (ref 32–36)
MCV RBC AUTO: 92.3 FL (ref 80–96)
MONOCYTES # BLD AUTO: 0.92 K/UL (ref 0–0.8)
MONOCYTES NFR BLD AUTO: 9.6 % (ref 2–6)
MPC BLD CALC-MCNC: 9.7 FL (ref 9.4–12.4)
NEUTROPHILS # BLD AUTO: 7.32 K/UL (ref 1.8–7.7)
NEUTROPHILS NFR BLD AUTO: 76 % (ref 53–65)
NRBC # BLD AUTO: 0 X10E3/UL
NRBC, AUTO (.00): 0 %
PLATELET # BLD AUTO: 223 K/UL (ref 150–400)
POTASSIUM SERPL-SCNC: 4.6 MMOL/L (ref 3.5–5.1)
RBC # BLD AUTO: 3.25 M/UL (ref 4.2–5.4)
SODIUM SERPL-SCNC: 136 MMOL/L (ref 136–145)
WBC # BLD AUTO: 9.63 K/UL (ref 4.5–11)

## 2024-08-27 PROCEDURE — 36415 COLL VENOUS BLD VENIPUNCTURE: CPT | Performed by: ORTHOPAEDIC SURGERY

## 2024-08-27 PROCEDURE — 97116 GAIT TRAINING THERAPY: CPT

## 2024-08-27 PROCEDURE — 86580 TB INTRADERMAL TEST: CPT | Performed by: ORTHOPAEDIC SURGERY

## 2024-08-27 PROCEDURE — 94761 N-INVAS EAR/PLS OXIMETRY MLT: CPT

## 2024-08-27 PROCEDURE — 82962 GLUCOSE BLOOD TEST: CPT

## 2024-08-27 PROCEDURE — 30200315 PPD INTRADERMAL TEST REV CODE 302: Performed by: ORTHOPAEDIC SURGERY

## 2024-08-27 PROCEDURE — 11000001 HC ACUTE MED/SURG PRIVATE ROOM

## 2024-08-27 PROCEDURE — 97110 THERAPEUTIC EXERCISES: CPT

## 2024-08-27 PROCEDURE — 85025 COMPLETE CBC W/AUTO DIFF WBC: CPT | Performed by: ORTHOPAEDIC SURGERY

## 2024-08-27 PROCEDURE — 80048 BASIC METABOLIC PNL TOTAL CA: CPT | Performed by: ORTHOPAEDIC SURGERY

## 2024-08-27 PROCEDURE — 97535 SELF CARE MNGMENT TRAINING: CPT

## 2024-08-27 PROCEDURE — 25000003 PHARM REV CODE 250: Performed by: ORTHOPAEDIC SURGERY

## 2024-08-27 PROCEDURE — 99232 SBSQ HOSP IP/OBS MODERATE 35: CPT | Mod: ,,, | Performed by: INTERNAL MEDICINE

## 2024-08-27 PROCEDURE — 63600175 PHARM REV CODE 636 W HCPCS: Performed by: HOSPITALIST

## 2024-08-27 RX ORDER — ONDANSETRON 4 MG/1
8 TABLET, ORALLY DISINTEGRATING ORAL EVERY 8 HOURS PRN
Qty: 30 TABLET | Refills: 0 | Status: SHIPPED | OUTPATIENT
Start: 2024-08-27

## 2024-08-27 RX ORDER — OXYCODONE AND ACETAMINOPHEN 10; 325 MG/1; MG/1
1 TABLET ORAL EVERY 6 HOURS PRN
Qty: 30 TABLET | Refills: 0 | Status: SHIPPED | OUTPATIENT
Start: 2024-08-27

## 2024-08-27 RX ORDER — ASPIRIN 325 MG
325 TABLET, DELAYED RELEASE (ENTERIC COATED) ORAL DAILY
Qty: 30 TABLET | Refills: 0 | Status: SHIPPED | OUTPATIENT
Start: 2024-08-27 | End: 2025-08-27

## 2024-08-27 RX ORDER — AMOXICILLIN 250 MG
1 CAPSULE ORAL 2 TIMES DAILY
Qty: 60 TABLET | Refills: 2 | Status: SHIPPED | OUTPATIENT
Start: 2024-08-27

## 2024-08-27 RX ADMIN — MUPIROCIN 1 G: 20 OINTMENT TOPICAL at 08:08

## 2024-08-27 RX ADMIN — OXYCODONE 10 MG: 5 TABLET ORAL at 06:08

## 2024-08-27 RX ADMIN — FUROSEMIDE 20 MG: 20 TABLET ORAL at 10:08

## 2024-08-27 RX ADMIN — LISINOPRIL 10 MG: 10 TABLET ORAL at 10:08

## 2024-08-27 RX ADMIN — ATENOLOL 50 MG: 25 TABLET ORAL at 10:08

## 2024-08-27 RX ADMIN — OXYCODONE 10 MG: 5 TABLET ORAL at 12:08

## 2024-08-27 RX ADMIN — CHLORTHALIDONE 25 MG: 25 TABLET ORAL at 09:08

## 2024-08-27 RX ADMIN — FAMOTIDINE 20 MG: 20 TABLET, FILM COATED ORAL at 10:08

## 2024-08-27 RX ADMIN — PREGABALIN 75 MG: 75 CAPSULE ORAL at 10:08

## 2024-08-27 RX ADMIN — DOCUSATE SODIUM 100 MG: 100 CAPSULE, LIQUID FILLED ORAL at 08:08

## 2024-08-27 RX ADMIN — TUBERCULIN PURIFIED PROTEIN DERIVATIVE 5 UNITS: 5 INJECTION, SOLUTION INTRADERMAL at 11:08

## 2024-08-27 RX ADMIN — OXYCODONE 10 MG: 5 TABLET ORAL at 08:08

## 2024-08-27 RX ADMIN — POTASSIUM CHLORIDE 20 MEQ: 1500 TABLET, EXTENDED RELEASE ORAL at 10:08

## 2024-08-27 RX ADMIN — MUPIROCIN 1 G: 20 OINTMENT TOPICAL at 10:08

## 2024-08-27 RX ADMIN — PREGABALIN 75 MG: 75 CAPSULE ORAL at 08:08

## 2024-08-27 RX ADMIN — CITALOPRAM HYDROBROMIDE 20 MG: 20 TABLET ORAL at 10:08

## 2024-08-27 RX ADMIN — MORPHINE SULFATE 3 MG: 4 INJECTION, SOLUTION INTRAMUSCULAR; INTRAVENOUS at 10:08

## 2024-08-27 RX ADMIN — INSULIN ASPART 2 UNITS: 100 INJECTION, SOLUTION INTRAVENOUS; SUBCUTANEOUS at 11:08

## 2024-08-27 RX ADMIN — ASPIRIN 81 MG CHEWABLE TABLET 324 MG: 81 TABLET CHEWABLE at 10:08

## 2024-08-27 RX ADMIN — FAMOTIDINE 20 MG: 20 TABLET, FILM COATED ORAL at 08:08

## 2024-08-27 RX ADMIN — OXYCODONE 10 MG: 5 TABLET ORAL at 04:08

## 2024-08-27 RX ADMIN — DOCUSATE SODIUM 100 MG: 100 CAPSULE, LIQUID FILLED ORAL at 10:08

## 2024-08-27 RX ADMIN — METFORMIN HYDROCHLORIDE 1000 MG: 500 TABLET ORAL at 04:08

## 2024-08-27 RX ADMIN — METFORMIN HYDROCHLORIDE 1000 MG: 500 TABLET ORAL at 06:08

## 2024-08-27 NOTE — ASSESSMENT & PLAN NOTE
S/P right hip arthroplasty  Defer any changes in care to orthopedics  She is doing well and ok to go to swingbed from my standpoint

## 2024-08-27 NOTE — HOSPITAL COURSE
8/27- patient is currently resting comfortably in no distress. Plan is for patient to go to swingbed.  8/28 patient states that she feels better today. Just waiting on swingbed.  8/29-resting comfortably today and oxygenating adequately.  She is afebrile.  Accu-Cheks look good.

## 2024-08-27 NOTE — ASSESSMENT & PLAN NOTE
Chronic, controlled. Latest blood pressure and vitals reviewed-     Temp:  [97.3 °F (36.3 °C)-100.3 °F (37.9 °C)]   Pulse:  [64-78]   Resp:  []   BP: ()/(60-84)   SpO2:  [83 %-100 %] .   Home meds for hypertension were reviewed and noted below.   Hypertension Medications               atenoloL-chlorthalidone (TENORETIC) 50-25 mg Tab atenolol 50 mg-chlorthalidone 25 mg tablet   TAKE 1 TABLET BY MOUTH ONCE DAILY    furosemide (LASIX) 20 MG tablet furosemide 20 mg tablet   TAKE 1 TABLET BY MOUTH ONCE DAILY    lisinopriL 10 MG tablet lisinopril 10 mg tablet   Take 1 tablet by mouth once daily            While in the hospital, will manage blood pressure as follows; Continue home antihypertensive regimen    Will utilize p.r.n. blood pressure medication only if patient's blood pressure greater than 180/110 and she develops symptoms such as worsening chest pain or shortness of breath.

## 2024-08-27 NOTE — SUBJECTIVE & OBJECTIVE
Interval History:  No acute events overnight.  Patient is currently resting comfortably in no distress.  She is afebrile and vital signs are stable.      Objective:     Vital Signs (Most Recent):  Temp: 99.2 °F (37.3 °C) (08/27/24 0915)  Pulse: 73 (08/27/24 0915)  Resp: 16 (08/27/24 0915)  BP: 116/73 (08/27/24 0915)  SpO2: 95 % (08/27/24 0915) Vital Signs (24h Range):  Temp:  [97.3 °F (36.3 °C)-100.3 °F (37.9 °C)] 99.2 °F (37.3 °C)  Pulse:  [64-78] 73  Resp:  [] 16  SpO2:  [83 %-100 %] 95 %  BP: ()/(60-84) 116/73     Weight: 64.3 kg (141 lb 12.1 oz)  Body mass index is 25.93 kg/m².      Intake/Output Summary (Last 24 hours) at 8/27/2024 0949  Last data filed at 8/26/2024 2243  Gross per 24 hour   Intake 110.68 ml   Output 1100 ml   Net -989.32 ml        Physical Exam  Constitutional:       General: She is not in acute distress.     Appearance: Normal appearance. She is not ill-appearing.   HENT:      Head: Normocephalic and atraumatic.      Right Ear: External ear normal.      Left Ear: External ear normal.      Nose: Nose normal.      Mouth/Throat:      Mouth: Mucous membranes are moist.      Pharynx: Oropharynx is clear.   Eyes:      Extraocular Movements: Extraocular movements intact.      Conjunctiva/sclera: Conjunctivae normal.      Pupils: Pupils are equal, round, and reactive to light.   Cardiovascular:      Rate and Rhythm: Normal rate and regular rhythm.      Pulses: Normal pulses.      Heart sounds: Normal heart sounds.   Pulmonary:      Effort: Pulmonary effort is normal. No respiratory distress.      Breath sounds: Normal breath sounds. No wheezing or rales.   Abdominal:      General: Abdomen is flat. Bowel sounds are normal.      Palpations: Abdomen is soft.   Musculoskeletal:      Cervical back: Normal range of motion and neck supple.      Right lower leg: No edema.      Left lower leg: No edema.      Comments: R hip post-operative changes     Skin:     General: Skin is warm and dry.       Coloration: Skin is not pale.   Neurological:      General: No focal deficit present.      Mental Status: She is alert and oriented to person, place, and time.      Cranial Nerves: No cranial nerve deficit.      Motor: No weakness.   Psychiatric:         Mood and Affect: Mood normal.         Behavior: Behavior normal.           Review of Systems    Vents:       Lines/Drains/Airways       Peripheral Intravenous Line  Duration                  Peripheral IV - Single Lumen 08/26/24 0610 22 G Left Antecubital 1 day                    Significant Labs:    CBC/Anemia Profile:  Recent Labs   Lab 08/27/24  0405   WBC 9.63   HGB 9.6*   HCT 30.0*      MCV 92.3   RDW 13.8        Chemistries:  Recent Labs   Lab 08/27/24  0405      K 4.6      CO2 26   BUN 27*   CREATININE 1.97*   CALCIUM 8.4*       All pertinent labs within the past 24 hours have been reviewed.    Significant Imaging:  I have reviewed all pertinent imaging results/findings within the past 24 hours.

## 2024-08-27 NOTE — PT/OT/SLP PROGRESS
Physical Therapy Treatment    Patient Name:  January Santillan   MRN:  [id number]    Recommendations:     Discharge Recommendations: High Intensity Therapy  Discharge Equipment Recommendations: walker, rolling  Barriers to discharge: None    Assessment:     January Santillan is a 69 y.o. female admitted with a medical diagnosis of Osteoarthritis of right hip.  She presents with the following impairments/functional limitations: weakness, impaired endurance, impaired functional mobility, pain     Participated well with PT. Ambulated 13 ft with RW and CGA. Good pain control. Educated on WB status and posterior hip precautions    Rehab Prognosis: Good; patient would benefit from acute skilled PT services to address these deficits and reach maximum level of function.    Recent Surgery: Procedure(s) (LRB):  ROBOTIC ARTHROPLASTY,HIP (Right) 1 Day Post-Op    Plan:     During this hospitalization, patient to be seen BID (5x/wk, daily 2x/wk) to address the identified rehab impairments via gait training, therapeutic activities, therapeutic exercises and progress toward the following goals:    Plan of Care Expires:  09/26/24    Subjective     Chief Complaint: R hip pain  Patient/Family Comments/goals: agreeable to treatment  Pain/Comfort:  Pain Rating 1: 8/10  Location - Side 1: Right  Location 1: hip      Objective:     Communicated with nurse prior to session.  Patient found supine with peripheral IV, hip abduction pillow, SCD upon PT entry to room.     General Precautions: Standard, fall  Orthopedic Precautions: RLE posterior precautions, RLE weight bearing as tolerated  Braces: N/A  Respiratory Status: Room air     Functional Mobility:  Bed Mobility:     Supine to Sit: minimum assistance  Transfers:     Sit to Stand:  contact guard assistance with rolling walker  Gait: 13 ft with RW and CGA  Balance: good      AM-PAC 6 CLICK MOBILITY  Turning over in bed (including adjusting bedclothes, sheets and blankets)?: 3  Sitting down on and  standing up from a chair with arms (e.g., wheelchair, bedside commode, etc.): 3  Moving from lying on back to sitting on the side of the bed?: 3  Moving to and from a bed to a chair (including a wheelchair)?: 3  Need to walk in hospital room?: 3  Climbing 3-5 steps with a railing?: 3  Basic Mobility Total Score: 18       Treatment & Education:  -AP, QS, SLR, Hip add/abduction, LE flexion/ext; all 3x10 repetitions BLE  -Mobility as noted  Avoid:  -Hip flexion past 90 degrees  -Hip IR/Toe in  -Hip adduction past midline        Patient left up in chair with all lines intact, call button in reach, and nurse notified..    GOALS:   Multidisciplinary Problems       Physical Therapy Goals          Problem: Physical Therapy    Goal Priority Disciplines Outcome Goal Variances Interventions   Physical Therapy Goal     PT, PT/OT Progressing     Description: Short term goals:  Pt will perform supine to sit with minimum assistance  Pt will perform sit to stand with minimum assistance  Pt will independently verbalize hip precautions  Pt will ambulate 50 ft with minimum assistance using rolling walker    Long term goals:  Pt will perform supine to sit with contact guard assistance  Pt will perform sit to stand with contact guard assistance  Pt will ambulate 100 ft with contact guard assistance using rolling walker                         Time Tracking:     PT Received On: 08/27/24  PT Start Time: 0918     PT Stop Time: 0943  PT Total Time (min): 25 min     Billable Minutes: Gait Training 10 and Therapeutic Exercise 15    Treatment Type: Treatment  PT/PTA: PT     Number of PTA visits since last PT visit: 0     08/27/2024

## 2024-08-27 NOTE — PLAN OF CARE
MAGGY faxed updates and ot note to Carito at [facility]. SW awaiting insurance approval. PT skin test needs to be placed today. MAGGY following.     1400: MAGGY faxed updated therapy notes and progress note to Carito at [facility]. Insurance is still pending. MAGGY informed Dr. Loyd and pt will stay another night. MAGGY following.

## 2024-08-27 NOTE — PROGRESS NOTES
[facility] - Orthopedic  Wound Care    Patient Name:  January Santillan   MRN:  [id number]  Date: 8/27/2024  Diagnosis: Osteoarthritis of right hip    History:     Past Medical History:   Diagnosis Date    Diabetes mellitus, type 2     Hypertension        Social History     Socioeconomic History    Marital status: Unknown   Tobacco Use    Smoking status: Never    Smokeless tobacco: Never   Substance and Sexual Activity    Alcohol use: Never    Drug use: Never     Social Determinants of Health     Financial Resource Strain: Low Risk  (8/26/2024)    Overall Financial Resource Strain (CARDIA)     Difficulty of Paying Living Expenses: Not very hard   Food Insecurity: No Food Insecurity (8/26/2024)    Hunger Vital Sign     Worried About Running Out of Food in the Last Year: Never true     Ran Out of Food in the Last Year: Never true   Transportation Needs: No Transportation Needs (8/26/2024)    TRANSPORTATION NEEDS     Transportation : No   Physical Activity: Insufficiently Active (8/26/2024)    Exercise Vital Sign     Days of Exercise per Week: 3 days     Minutes of Exercise per Session: 10 min   Stress: No Stress Concern Present (8/26/2024)    [employer] [place] [employer] - Occupational Stress Questionnaire     Feeling of Stress : Not at all   Housing Stability: Low Risk  (8/26/2024)    Housing Stability Vital Sign     Unable to Pay for Housing in the Last Year: No     Homeless in the Last Year: No       Precautions:     Allergies as of 08/08/2024 - Reviewed 08/08/2024   Allergen Reaction Noted    Codeine  08/08/2024    Tylenol [acetaminophen]  08/08/2024       [facility] Assessment Details/Treatment       Narrative: Seen patient for initiation of preventative skin care measures    Patient up in chair. Alert. Unable to evaluate sacral.  See nursing assessment. Bilateral heels with out pressure injury noted. Has Mepliex Foam border to sacral and heels.   Has overlay to bed.   Vishal score 19.     Consult skin  care for any skin issues      08/27/2024

## 2024-08-27 NOTE — PLAN OF CARE
Problem: Adult Inpatient Plan of Care  Goal: Plan of Care Review  Outcome: Progressing  Goal: Patient-Specific Goal (Individualized)  Outcome: Progressing  Goal: Absence of Hospital-Acquired Illness or Injury  Outcome: Progressing  Goal: Optimal Comfort and Wellbeing  Outcome: Progressing  Goal: Readiness for Transition of Care  Outcome: Progressing     Problem: Diabetes Comorbidity  Goal: Blood Glucose Level Within Targeted Range  Outcome: Progressing     Problem: Wound  Goal: Optimal Coping  Outcome: Progressing  Goal: Optimal Functional Ability  Outcome: Progressing  Goal: Absence of Infection Signs and Symptoms  Outcome: Progressing  Goal: Improved Oral Intake  Outcome: Progressing  Goal: Optimal Pain Control and Function  Outcome: Progressing  Goal: Skin Health and Integrity  Outcome: Progressing  Goal: Optimal Wound Healing  Outcome: Progressing     Problem: Infection  Goal: Absence of Infection Signs and Symptoms  Outcome: Progressing     Problem: Skin Injury Risk Increased  Goal: Skin Health and Integrity  Outcome: Progressing     Problem: Pain Acute  Goal: Optimal Pain Control and Function  Outcome: Progressing     Problem: Fall Injury Risk  Goal: Absence of Fall and Fall-Related Injury  Outcome: Progressing

## 2024-08-27 NOTE — PT/OT/SLP PROGRESS
"Occupational Therapy   Treatment    Name: January Santillan  MRN: [id number]  Admitting Diagnosis:  Osteoarthritis of right hip  1 Day Post-Op    Recommendations:     Discharge Recommendations: High Intensity Therapy  Discharge Equipment Recommendations:  walker, rolling  Barriers to discharge:       Assessment:     January Santillan is a 69 y.o. female with a medical diagnosis of Osteoarthritis of right hip.  She presents with alert and agreeable to treatment. Performance deficits affecting function are weakness, impaired endurance, impaired self care skills, impaired functional mobility, pain, orthopedic precautions.     Rehab Prognosis:  Good; patient would benefit from acute skilled OT services to address these deficits and reach maximum level of function.       Plan:     Patient to be seen 5 x/week to address the above listed problems via self-care/home management, therapeutic activities, therapeutic exercises  Plan of Care Expires: 09/30/24  Plan of Care Reviewed with: patient, spouse    Subjective     Chief Complaint: Osteoarthritis of right hip    Patient/Family Comments/goals: "I'm going to make it. With this surgery, I have had 26 surgeries."  Pain/Comfort:  Pain Rating 1: 7/10  Location - Side 1: Right  Location 1: hip  Pain Addressed 1: Pre-medicate for activity, Reposition, Cessation of Activity  Pain Rating Post-Intervention 1: 7/10    Objective:     Communicated with: TALIA Peterson prior to session.  Patient found up in chair with peripheral IV upon OT entry to room.    General Precautions: Standard, fall    Orthopedic Precautions:RLE weight bearing as tolerated, RLE posterior precautions  Braces: N/A  Respiratory Status: Room air     Occupational Performance:     Bed Mobility:    NT    Functional Mobility/Transfers:  NT    Activities of Daily Living:  Bathing: pt bathed from chair with bath wipes as there was no hot water in pt's room. Pt required assistance for her back and lower legs/feet. Pt leaned side to side " to wash perineum due to pain.    Upper Body Dressing: contact guard assistance for hospital gown  Lower Body Dressing: moderate assistance using reacher to initiate donning panties and doffing panties with verbal cues. And using sockaid to don socks       [facility] 6 Click ADL: 20    Treatment & Education:  Pt was educated on THR precautions. Pt was only able to state 1/3 posterior THR precautions. Pt was educated on using reacher and sockaid to assist with LE dressing while adhering to THR precautions. Pt will require additional review.    Patient left up in chair with all lines intact, call button in reach, RN notified, and spouse present    GOALS:   Multidisciplinary Problems       Occupational Therapy Goals          Problem: Occupational Therapy    Goal Priority Disciplines Outcome Interventions   Occupational Therapy Goal     OT, PT/OT Progressing    Description:   STG: (in 1 week)  Pt will bathe with min(A)  with bath sponge  Pt will perform UE dressing with setup  Pt will perform LE dressing with min(A) using reacher and sockaid  Pt will state and adhere to THR precautions during self care and mobility  Pt will t/f bed/chair/bsc with CGA using RW  Perform standing task x 2 min with CGA  using RW  Tolerate 15 min of tx without fatigue.    LTG: in 6 weeks  Restore to max I with selfcare and mobility.                          Time Tracking:     OT Date of Treatment: 08/27/24  OT Start Time: 1011  OT Stop Time: 1038  OT Total Time (min): 27 min    Billable Minutes:Self Care/Home Management 27 min    OT/INDIO: OT          8/27/2024

## 2024-08-27 NOTE — PROGRESS NOTES
[facility]  Pulmonology  Progress Note    Patient Name: January Santillan  MRN: [id number]  Admission Date: 8/26/2024  Hospital Length of Stay: 0 days  Code Status: Full Code  Attending Provider: Evert López MD  Primary Care Provider: Trang Miller FNP-C   Principal Problem: Osteoarthritis of right hip    Subjective:     Interval History:  No acute events overnight.  Patient is currently resting comfortably in no distress.  She is afebrile and vital signs are stable.      Objective:     Vital Signs (Most Recent):  Temp: 99.2 °F (37.3 °C) (08/27/24 0915)  Pulse: 73 (08/27/24 0915)  Resp: 16 (08/27/24 0915)  BP: 116/73 (08/27/24 0915)  SpO2: 95 % (08/27/24 0915) Vital Signs (24h Range):  Temp:  [97.3 °F (36.3 °C)-100.3 °F (37.9 °C)] 99.2 °F (37.3 °C)  Pulse:  [64-78] 73  Resp:  [] 16  SpO2:  [83 %-100 %] 95 %  BP: ()/(60-84) 116/73     Weight: 64.3 kg (141 lb 12.1 oz)  Body mass index is 25.93 kg/m².      Intake/Output Summary (Last 24 hours) at 8/27/2024 0949  Last data filed at 8/26/2024 2243  Gross per 24 hour   Intake 110.68 ml   Output 1100 ml   Net -989.32 ml        Physical Exam  Constitutional:       General: She is not in acute distress.     Appearance: Normal appearance. She is not ill-appearing.   HENT:      Head: Normocephalic and atraumatic.      Right Ear: External ear normal.      Left Ear: External ear normal.      Nose: Nose normal.      Mouth/Throat:      Mouth: Mucous membranes are moist.      Pharynx: Oropharynx is clear.   Eyes:      Extraocular Movements: Extraocular movements intact.      Conjunctiva/sclera: Conjunctivae normal.      Pupils: Pupils are equal, round, and reactive to light.   Cardiovascular:      Rate and Rhythm: Normal rate and regular rhythm.      Pulses: Normal pulses.      Heart sounds: Normal heart sounds.   Pulmonary:      Effort: Pulmonary effort is normal. No respiratory distress.      Breath sounds: Normal breath sounds. No wheezing or rales.    Abdominal:      General: Abdomen is flat. Bowel sounds are normal.      Palpations: Abdomen is soft.   Musculoskeletal:      Cervical back: Normal range of motion and neck supple.      Right lower leg: No edema.      Left lower leg: No edema.      Comments: R hip post-operative changes     Skin:     General: Skin is warm and dry.      Coloration: Skin is not pale.   Neurological:      General: No focal deficit present.      Mental Status: She is alert and oriented to person, place, and time.      Cranial Nerves: No cranial nerve deficit.      Motor: No weakness.   Psychiatric:         Mood and Affect: Mood normal.         Behavior: Behavior normal.           Review of Systems    Vents:       Lines/Drains/Airways       Peripheral Intravenous Line  Duration                  Peripheral IV - Single Lumen 08/26/24 0610 22 G Left Antecubital 1 day                    Significant Labs:    CBC/Anemia Profile:  Recent Labs   Lab 08/27/24  0405   WBC 9.63   HGB 9.6*   HCT 30.0*      MCV 92.3   RDW 13.8        Chemistries:  Recent Labs   Lab 08/27/24  0405      K 4.6      CO2 26   BUN 27*   CREATININE 1.97*   CALCIUM 8.4*       All pertinent labs within the past 24 hours have been reviewed.    Significant Imaging:  I have reviewed all pertinent imaging results/findings within the past 24 hours.  Assessment/Plan:     Cardiac/Vascular  Dyslipidemia  Stable-currently not on a statin  OK to resume home med at discharge    Essential hypertension  Chronic, controlled. Latest blood pressure and vitals reviewed-     Temp:  [97.3 °F (36.3 °C)-100.3 °F (37.9 °C)]   Pulse:  [64-78]   Resp:  []   BP: ()/(60-84)   SpO2:  [83 %-100 %] .   Home meds for hypertension were reviewed and noted below.   Hypertension Medications               atenoloL-chlorthalidone (TENORETIC) 50-25 mg Tab atenolol 50 mg-chlorthalidone 25 mg tablet   TAKE 1 TABLET BY MOUTH ONCE DAILY    furosemide (LASIX) 20 MG tablet furosemide 20  "mg tablet   TAKE 1 TABLET BY MOUTH ONCE DAILY    lisinopriL 10 MG tablet lisinopril 10 mg tablet   Take 1 tablet by mouth once daily            While in the hospital, will manage blood pressure as follows; Continue home antihypertensive regimen    Will utilize p.r.n. blood pressure medication only if patient's blood pressure greater than 180/110 and she develops symptoms such as worsening chest pain or shortness of breath.    Endocrine  Type 2 diabetes mellitus without complication, without long-term current use of insulin  Patient's FSGs are controlled on current medication regimen.  Last A1c reviewed-   Lab Results   Component Value Date    HGBA1C 7.3 (H) 08/26/2024     Most recent fingerstick glucose reviewed- No results for input(s): "POCTGLUCOSE" in the last 24 hours.  Current correctional scale  Low  Maintain anti-hyperglycemic dose as follows-   Antihyperglycemics (From admission, onward)      Start     Stop Route Frequency Ordered    08/26/24 1715  metFORMIN tablet 1,000 mg         -- Oral 2 times daily with meals 08/26/24 1130    08/26/24 1353  insulin aspart U-100 injection 0-5 Units         -- SubQ Before meals & nightly PRN 08/26/24 1255    08/26/24 1145  dapagliflozin propanediol tablet 5 mg        Question Answer Comment   Does this patient have a diagnosis of heart failure? Yes    Does this patient have type 1 diabetes or diabetic ketoacidosis? No    Does this patient have symptomatic hypotension? No    Is the patient NPO or pending major surgery in next 3 days or less? No        -- Oral Daily 08/26/24 1130    08/26/24 1145  liraglutide 0.6 mg/0.1 mL (18 mg/3 mL) subq PNIJ pen 0.6 mg         -- SubQ Daily 08/26/24 1130          Hold Oral hypoglycemics while patient is in the hospital.    GI  Gastroesophageal reflux disease  Stable- currently has famotidine    Orthopedic  Right hip pain  S/P right hip arthroplasty  Defer any changes in care to orthopedics  She is doing well and ok to go to swingbed " from my standpoint                 Bipin Cuevas, DO  Pulmonology  [facility]

## 2024-08-27 NOTE — CONSULTS
[facility]  [facility] Medicine  Consult Note    Patient Name: January Santillan  MRN: [id number]  Admission Date: 8/26/2024  Hospital Length of Stay: 0 days  Attending Physician: Evert López MD   Primary Care Provider: Trang Miller FNP-C           Patient information was obtained from patient, past medical records, and ER records.     Inpatient consult to Hospitalist  Consult performed by: Richard Sanford MD  Consult ordered by: Evert López MD        Subjective:     Principal Problem: Osteoarthritis of right hip    Chief Complaint: No chief complaint on file.       HPI: Ms. January Santillan is a 68yo woman history of DM2, HTN, HLD, gastric ulcer, GERD who presents with progressive R hip degenerative joint disease that has failed conservative care.  She is now s/p R hip repair.  Post-operative course was unremarkable.  She denies symptoms at this time.  She states she would like to go to swing-bed if possible.      Past Medical History:   Diagnosis Date    Diabetes mellitus, type 2     Hypertension        Past Surgical History:   Procedure Laterality Date    HAND SURGERY      SHOULDER ARTHROSCOPY      TUBAL LIGATION         Review of patient's allergies indicates:   Allergen Reactions    Codeine     Tylenol [acetaminophen]        No current facility-administered medications on file prior to encounter.     Current Outpatient Medications on File Prior to Encounter   Medication Sig    atenoloL-chlorthalidone (TENORETIC) 50-25 mg Tab atenolol 50 mg-chlorthalidone 25 mg tablet   TAKE 1 TABLET BY MOUTH ONCE DAILY    lisinopriL 10 MG tablet lisinopril 10 mg tablet   Take 1 tablet by mouth once daily    alendronate (FOSAMAX) 70 MG tablet alendronate 70 mg tablet   TAKE 1 TABLET BY MOUTH ONCE A WEEK    allopurinoL (ZYLOPRIM) 100 MG tablet allopurinol 100 mg tablet   TAKE 1 TABLET BY MOUTH ONCE DAILY (Patient not taking: Reported on 8/21/2024)    baclofen (LIORESAL) 10 MG tablet baclofen 10 mg tablet   Take 1  tablet 4 times a day by oral route as needed. (Patient not taking: Reported on 8/21/2024)    citalopram (CELEXA) 20 MG tablet citalopram 20 mg tablet   TAKE 1 TABLET BY MOUTH ONCE DAILY AT BEDTIME    dexamethasone (DECADRON) 4 mg/mL injection 4 mg. (Patient not taking: Reported on 8/21/2024)    furosemide (LASIX) 20 MG tablet furosemide 20 mg tablet   TAKE 1 TABLET BY MOUTH ONCE DAILY    gabapentin (NEURONTIN) 600 MG tablet gabapentin 600 mg tablet   TAKE 0.5 TABLET BY MOUTH IN AM AND 1 TABLET BY MOUTH IN PM    hydrOXYzine (ATARAX) 50 MG tablet hydroxyzine HCl 50 mg tablet   TAKE 1 TABLET BY MOUTH AS NEEDED AT BEDTIME    ketorolac (TORADOL) 60 mg/2 mL Soln 60 mg. (Patient not taking: Reported on 8/21/2024)    magnesium chloride (SLOW-MAG) 71.5 mg TbEC Slow-Mag 71.5 mg tablet,delayed release   Take 1 tablet twice a day by oral route.    metFORMIN (GLUCOPHAGE) 1000 MG tablet metformin 1,000 mg tablet   TAKE 1 TABLET BY MOUTH TWICE DAILY    methylPREDNISolone acetate (DEPO-MEDROL) 40 mg/mL injection 40 mg. (Patient not taking: Reported on 8/21/2024)    naproxen (NAPROSYN) 500 MG tablet Take 1 tablet (500 mg total) by mouth 2 (two) times daily with meals.    omeprazole (PRILOSEC) 20 MG capsule Take 20 mg by mouth once daily.    potassium chloride SA (K-DUR,KLOR-CON) 20 MEQ tablet potassium chloride ER 20 mEq tablet,extended release(part/cryst)   TAKE 1 TABLET BY MOUTH DAILY    simvastatin (ZOCOR) 40 MG tablet simvastatin 40 mg tablet   TAKE 1 TABLET BY MOUTH ONCE DAILY    traMADoL (ULTRAM) 50 mg tablet tramadol 50 mg tablet   TAKE 1 TABLET BY MOUTH EVERY 12 HOURS AS NEEDED FOR PAIN     Family History       Problem Relation (Age of Onset)    Diabetes Mother, Sister, Brother    Hypertension Mother, Father          Tobacco Use    Smoking status: Never    Smokeless tobacco: Never   Substance and Sexual Activity    Alcohol use: Never    Drug use: Never    Sexual activity: Not on file     Review of Systems   Constitutional:   Negative for activity change, chills, fatigue and fever.   HENT:  Negative for congestion and sore throat.    Respiratory:  Negative for chest tightness.    Gastrointestinal:  Negative for abdominal distention, abdominal pain and diarrhea.   Endocrine: Negative for cold intolerance and heat intolerance.   Genitourinary:  Negative for dysuria.   Musculoskeletal:  Negative for arthralgias and back pain.        R hip pain     Skin:  Negative for color change and rash.   Neurological:  Negative for dizziness, tremors and headaches.   Psychiatric/Behavioral:  Negative for agitation. The patient is not nervous/anxious.      Objective:     Vital Signs (Most Recent):  Temp: 97.3 °F (36.3 °C) (08/26/24 1046)  Pulse: 69 (08/26/24 1200)  Resp: 20 (08/26/24 1201)  BP: (!) 157/72 (08/26/24 1146)  SpO2: 100 % (08/26/24 1200) Vital Signs (24h Range):  Temp:  [97.3 °F (36.3 °C)-98.6 °F (37 °C)] 97.3 °F (36.3 °C)  Pulse:  [65-75] 69  Resp:  [] 20  SpO2:  [94 %-100 %] 100 %  BP: (120-177)/(60-86) 157/72     Weight: 64.3 kg (141 lb 12.1 oz)  Body mass index is 25.93 kg/m².     Physical Exam  Constitutional:       Appearance: Normal appearance.   HENT:      Head: Normocephalic and atraumatic.      Nose: Nose normal.      Mouth/Throat:      Mouth: Mucous membranes are moist.      Pharynx: Oropharynx is clear.   Eyes:      Extraocular Movements: Extraocular movements intact.      Conjunctiva/sclera: Conjunctivae normal.      Pupils: Pupils are equal, round, and reactive to light.   Cardiovascular:      Rate and Rhythm: Normal rate and regular rhythm.      Pulses: Normal pulses.      Heart sounds: Normal heart sounds.   Pulmonary:      Effort: Pulmonary effort is normal.      Breath sounds: Normal breath sounds.   Abdominal:      General: Abdomen is flat. Bowel sounds are normal.      Palpations: Abdomen is soft.   Musculoskeletal:         General: Normal range of motion.      Cervical back: Normal range of motion and neck supple.  "     Comments: R hip post-operative changes     Skin:     General: Skin is warm and dry.   Neurological:      General: No focal deficit present.      Mental Status: She is alert and oriented to person, place, and time.   Psychiatric:         Mood and Affect: Mood normal.         Behavior: Behavior normal.      Significant Labs: All pertinent labs within the past 24 hours have been reviewed.    Significant Imaging: I have reviewed all pertinent imaging results/findings within the past 24 hours.  Assessment/Plan:     * Osteoarthritis of right hip  S/p R hip repair.      Now on  per ortho.    Monitor given history of peptic ulcer.        Peptic ulcer  Monitor while on ASA.       Gastroesophageal reflux disease  Continue home PPI.       Dyslipidemia  Continue home statin.         Essential hypertension  Chronic, controlled. Latest blood pressure and vitals reviewed-     Temp:  [97.3 °F (36.3 °C)-98.6 °F (37 °C)]   Pulse:  [65-75]   Resp:  []   BP: (120-177)/(60-86)   SpO2:  [94 %-100 %] .   Home meds for hypertension were reviewed and noted below.   Hypertension Medications               atenoloL-chlorthalidone (TENORETIC) 50-25 mg Tab atenolol 50 mg-chlorthalidone 25 mg tablet   TAKE 1 TABLET BY MOUTH ONCE DAILY    furosemide (LASIX) 20 MG tablet furosemide 20 mg tablet   TAKE 1 TABLET BY MOUTH ONCE DAILY    lisinopriL 10 MG tablet lisinopril 10 mg tablet   Take 1 tablet by mouth once daily            While in the hospital, will manage blood pressure as follows; Continue home antihypertensive regimen    Will utilize p.r.n. blood pressure medication only if patient's blood pressure greater than 160/100 and she develops symptoms such as worsening chest pain or shortness of breath.    Type 2 diabetes mellitus without complication, without long-term current use of insulin  Patient's FSGs are controlled on current medication regimen.  Last A1c reviewed- No results found for: "LABA1C", "HGBA1C"  Most recent " "fingerstick glucose reviewed- No results for input(s): "POCTGLUCOSE" in the last 24 hours.  Current correctional scale  Low  Maintain anti-hyperglycemic dose as follows-   Antihyperglycemics (From admission, onward)      Start     Stop Route Frequency Ordered    08/26/24 1715  metFORMIN tablet 1,000 mg         -- Oral 2 times daily with meals 08/26/24 1130    08/26/24 1145  dapagliflozin propanediol tablet 5 mg        Question Answer Comment   Does this patient have a diagnosis of heart failure? Yes    Does this patient have type 1 diabetes or diabetic ketoacidosis? No    Does this patient have symptomatic hypotension? No    Is the patient NPO or pending major surgery in next 3 days or less? No        -- Oral Daily 08/26/24 1130    08/26/24 1145  liraglutide 0.6 mg/0.1 mL (18 mg/3 mL) subq PNIJ pen 0.6 mg         -- SubQ Daily 08/26/24 1130          Hold Oral hypoglycemics while patient is in the hospital.      VTE Risk Mitigation (From admission, onward)           Ordered     IP VTE LOW RISK PATIENT  Once         08/26/24 1130     Place JOLIE hose  Until discontinued         08/26/24 1130     Place sequential compression device  Until discontinued         08/26/24 1130                        Thank you for your consult. I will follow-up with patient. Please contact us if you have any additional questions.    Richard Sanford MD  Department of Hospital Medicine   [facility]      "

## 2024-08-27 NOTE — PT/OT/SLP PROGRESS
Physical Therapy Treatment    Patient Name:  January Santillan   MRN:  [id number]    Recommendations:     Discharge Recommendations: Moderate Intensity Therapy  Discharge Equipment Recommendations: walker, rolling  Barriers to discharge: None    Assessment:     January Santillan is a 69 y.o. female admitted with a medical diagnosis of Osteoarthritis of right hip.  She presents with the following impairments/functional limitations: impaired endurance, weakness, impaired functional mobility, gait instability, impaired balance, decreased lower extremity function, decreased safety awareness, pain, decreased ROM, edema, orthopedic precautions     Participated well with therapeutic exercises this PM. Static standing using RW CGA/SBA Right lower extremity WBAT up to 2 min before requesting to sit back down onto bed.Continued education on WB status and posterior hip precautions. Pt demo understanding.     Rehab Prognosis: Good; patient would benefit from acute skilled PT services to address these deficits and reach maximum level of function.    Recent Surgery: Procedure(s) (LRB):  ROBOTIC ARTHROPLASTY,HIP (Right) 1 Day Post-Op    Plan:     During this hospitalization, patient to be seen (5x/wk, daily 2x/wk) to address the identified rehab impairments via therapeutic exercises and progress toward the following goals:    Plan of Care Expires:  09/26/24      Subjective     Chief Complaint: R hip pain prior to PM session   Patient/Family Comments/goals: agreeable to treatment   Pain/Comfort:  Pain Rating 1: 7/10  Location - Side 1: Right  Location - Orientation 1: lateral  Location 1: hip  Pain Addressed 1: Pre-medicate for activity, Reposition, Distraction      Objective:     Case conference with Rodolfo Christensen PT today. Communicated with PT, pt, and family prior to session.  Patient found supine with bed alarm, peripheral IV, PureWick , bilateral JOLIE hose donned upon PT entry to room.     General Precautions: Standard, fall  Orthopedic  Precautions: RLE weight bearing as tolerated  Braces: N/A  Respiratory Status: Room air     Functional Mobility:  Bed Mobility:     Supine to Sit: minimum assistance  Sit to Supine- Min/Mod with Bilateral Lower Extremities due to pain/weakness this PM  Transfers:     Sit to Stand:  contact guard assistance with rolling walker  Gait: 13 ft with RW and CGA-- not this tx  Balance: good- static       AM-PAC 6 CLICK MOBILITY          Treatment & Education:  - bed mobility and transfers listed above  -seated exercises EOB: bilateral Long Arc Quad, L hip flexion only, bilateral Ankle pumps   - static standing using RW for WB through Right lower extremity WBAT x 2 min CGA/SBA before requesting to sit back onto bed       Pt instructed again to Avoid:  -Hip flexion past 90 degrees  -Hip IR/Toe in  -Hip adduction past midline        Patient left HOB elevated with all lines intact, call button in reach, and family at bedside.     GOALS:   Multidisciplinary Problems       Physical Therapy Goals          Problem: Physical Therapy    Goal Priority Disciplines Outcome Goal Variances Interventions   Physical Therapy Goal     PT, PT/OT Progressing     Description: Short term goals:  Pt will perform supine to sit with minimum assistance  Pt will perform sit to stand with minimum assistance  Pt will independently verbalize hip precautions  Pt will ambulate 50 ft with minimum assistance using rolling walker    Long term goals:  Pt will perform supine to sit with contact guard assistance  Pt will perform sit to stand with contact guard assistance  Pt will ambulate 100 ft with contact guard assistance using rolling walker                         Time Tracking:     PT Received On: 08/27/24  PT Start Time: 1352     PT Stop Time: 1410  PT Total Time (min): 18 min     Billable Minutes: Therapeutic Exercise 18    Treatment Type: Treatment  PT/PTA: PTA     Number of PTA visits since last PT visit: 1 08/27/2024

## 2024-08-27 NOTE — PLAN OF CARE
Problem: Adult Inpatient Plan of Care  Goal: Plan of Care Review  8/27/2024 0532 by Khai Galan RN  Outcome: Progressing  8/26/2024 2318 by Khai Galan RN  Outcome: Progressing  Goal: Patient-Specific Goal (Individualized)  8/27/2024 0532 by Khai Galan RN  Outcome: Progressing  8/26/2024 2318 by Khai Galan RN  Outcome: Progressing  Goal: Absence of Hospital-Acquired Illness or Injury  8/27/2024 0532 by Khai Galan RN  Outcome: Progressing  8/26/2024 2318 by Khai Galan RN  Outcome: Progressing  Goal: Optimal Comfort and Wellbeing  8/27/2024 0532 by Khai Galan RN  Outcome: Progressing  8/26/2024 2318 by Khai Galan RN  Outcome: Progressing  Goal: Readiness for Transition of Care  8/27/2024 0532 by Khai Galan RN  Outcome: Progressing  8/26/2024 2318 by Khai Galan RN  Outcome: Progressing     Problem: Diabetes Comorbidity  Goal: Blood Glucose Level Within Targeted Range  8/27/2024 0532 by Khai Galan RN  Outcome: Progressing  8/26/2024 2318 by Khai Galan RN  Outcome: Progressing     Problem: Wound  Goal: Optimal Coping  8/27/2024 0532 by Khai Galan RN  Outcome: Progressing  8/26/2024 2318 by Khai Galan RN  Outcome: Progressing  Goal: Optimal Functional Ability  8/27/2024 0532 by Khai Galan RN  Outcome: Progressing  8/26/2024 2318 by Khai Galan RN  Outcome: Progressing  Goal: Absence of Infection Signs and Symptoms  8/27/2024 0532 by Khai Galan RN  Outcome: Progressing  8/26/2024 2318 by Khai Galan RN  Outcome: Progressing  Goal: Improved Oral Intake  8/27/2024 0532 by Khai Galan RN  Outcome: Progressing  8/26/2024 2318 by Khai Galan RN  Outcome: Progressing  Goal: Optimal Pain Control and Function  8/27/2024 0532 by Khai Galan RN  Outcome: Progressing  8/26/2024 2318 by Khai Galan RN  Outcome: Progressing  Goal: Skin Health and Integrity  8/27/2024 0532 by Adama,  TALIA Ridley  Outcome: Progressing  8/26/2024 2318 by Khai Galan RN  Outcome: Progressing  Goal: Optimal Wound Healing  8/27/2024 0532 by Khai Galan RN  Outcome: Progressing  8/26/2024 2318 by Khai Galan RN  Outcome: Progressing     Problem: Infection  Goal: Absence of Infection Signs and Symptoms  8/27/2024 0532 by Khai Galan RN  Outcome: Progressing  8/26/2024 2318 by Khai Galan RN  Outcome: Progressing     Problem: Skin Injury Risk Increased  Goal: Skin Health and Integrity  8/27/2024 0532 by Khai Galan RN  Outcome: Progressing  8/26/2024 2318 by Khai Galan RN  Outcome: Progressing     Problem: Pain Acute  Goal: Optimal Pain Control and Function  8/27/2024 0532 by Khai Galan RN  Outcome: Progressing  8/26/2024 2318 by Khai Galan RN  Outcome: Progressing     Problem: Fall Injury Risk  Goal: Absence of Fall and Fall-Related Injury  8/27/2024 0532 by Khai Galan RN  Outcome: Progressing  8/26/2024 2318 by Khai Galan RN  Outcome: Progressing

## 2024-08-28 LAB
GLUCOSE SERPL-MCNC: 110 MG/DL (ref 70–105)
GLUCOSE SERPL-MCNC: 143 MG/DL (ref 70–105)
GLUCOSE SERPL-MCNC: 149 MG/DL (ref 70–105)
GLUCOSE SERPL-MCNC: 76 MG/DL (ref 70–105)

## 2024-08-28 PROCEDURE — 97110 THERAPEUTIC EXERCISES: CPT

## 2024-08-28 PROCEDURE — 97116 GAIT TRAINING THERAPY: CPT

## 2024-08-28 PROCEDURE — 97535 SELF CARE MNGMENT TRAINING: CPT

## 2024-08-28 PROCEDURE — 97530 THERAPEUTIC ACTIVITIES: CPT

## 2024-08-28 PROCEDURE — 63600175 PHARM REV CODE 636 W HCPCS: Performed by: HOSPITALIST

## 2024-08-28 PROCEDURE — 94761 N-INVAS EAR/PLS OXIMETRY MLT: CPT

## 2024-08-28 PROCEDURE — 25000003 PHARM REV CODE 250: Performed by: ORTHOPAEDIC SURGERY

## 2024-08-28 PROCEDURE — 82962 GLUCOSE BLOOD TEST: CPT

## 2024-08-28 PROCEDURE — 11000001 HC ACUTE MED/SURG PRIVATE ROOM

## 2024-08-28 PROCEDURE — 99232 SBSQ HOSP IP/OBS MODERATE 35: CPT | Mod: ,,, | Performed by: INTERNAL MEDICINE

## 2024-08-28 RX ORDER — DOCUSATE SODIUM 100 MG/1
100 CAPSULE, LIQUID FILLED ORAL EVERY 12 HOURS
Status: DISCONTINUED | OUTPATIENT
Start: 2024-08-28 | End: 2024-08-29 | Stop reason: HOSPADM

## 2024-08-28 RX ADMIN — CITALOPRAM HYDROBROMIDE 20 MG: 20 TABLET ORAL at 08:08

## 2024-08-28 RX ADMIN — CHLORTHALIDONE 25 MG: 25 TABLET ORAL at 08:08

## 2024-08-28 RX ADMIN — METFORMIN HYDROCHLORIDE 1000 MG: 500 TABLET ORAL at 08:08

## 2024-08-28 RX ADMIN — DOCUSATE SODIUM 100 MG: 100 CAPSULE, LIQUID FILLED ORAL at 08:08

## 2024-08-28 RX ADMIN — LISINOPRIL 10 MG: 10 TABLET ORAL at 08:08

## 2024-08-28 RX ADMIN — FUROSEMIDE 20 MG: 20 TABLET ORAL at 08:08

## 2024-08-28 RX ADMIN — OXYCODONE 10 MG: 5 TABLET ORAL at 09:08

## 2024-08-28 RX ADMIN — MUPIROCIN 1 G: 20 OINTMENT TOPICAL at 08:08

## 2024-08-28 RX ADMIN — ATENOLOL 50 MG: 25 TABLET ORAL at 08:08

## 2024-08-28 RX ADMIN — PREGABALIN 75 MG: 75 CAPSULE ORAL at 08:08

## 2024-08-28 RX ADMIN — FAMOTIDINE 20 MG: 20 TABLET, FILM COATED ORAL at 08:08

## 2024-08-28 RX ADMIN — POTASSIUM CHLORIDE 20 MEQ: 1500 TABLET, EXTENDED RELEASE ORAL at 08:08

## 2024-08-28 RX ADMIN — POLYETHYLENE GLYCOL 3350 17 G: 17 POWDER, FOR SOLUTION ORAL at 08:08

## 2024-08-28 RX ADMIN — ASPIRIN 81 MG CHEWABLE TABLET 324 MG: 81 TABLET CHEWABLE at 08:08

## 2024-08-28 RX ADMIN — DAPAGLIFLOZIN 5 MG: 5 TABLET, FILM COATED ORAL at 09:08

## 2024-08-28 RX ADMIN — METFORMIN HYDROCHLORIDE 1000 MG: 500 TABLET ORAL at 05:08

## 2024-08-28 RX ADMIN — OXYCODONE 10 MG: 5 TABLET ORAL at 05:08

## 2024-08-28 RX ADMIN — MORPHINE SULFATE 3 MG: 4 INJECTION, SOLUTION INTRAMUSCULAR; INTRAVENOUS at 08:08

## 2024-08-28 NOTE — PT/OT/SLP PROGRESS
Physical Therapy Treatment    Patient Name:  January Santillan   MRN:  [id number]    Recommendations:     Discharge Recommendations: Moderate Intensity Therapy  Discharge Equipment Recommendations: walker, rolling  Barriers to discharge: None    Assessment:     January Santillan is a 69 y.o. female admitted with a medical diagnosis of Osteoarthritis of right hip.  She presents with the following impairments/functional limitations: weakness, impaired endurance, impaired functional mobility, pain     Patient doing very well with PT. Ambulated 70 ft with RW and CGA; educated on WB precautions    Rehab Prognosis: Good; patient would benefit from acute skilled PT services to address these deficits and reach maximum level of function.    Recent Surgery: Procedure(s) (LRB):  ROBOTIC ARTHROPLASTY,HIP (Right) 2 Days Post-Op    Plan:     During this hospitalization, patient to be seen 5 x/week (X2 daily) to address the identified rehab impairments via therapeutic exercises and progress toward the following goals:    Plan of Care Expires:  09/26/24    Subjective     Chief Complaint: R hip pain  Patient/Family Comments/goals: agreeable to treatment  Pain/Comfort:  Pain Rating 1: 7/10  Location - Side 1: Right  Location 1: hip      Objective:     Communicated with nurse prior to session.  Patient found supine with PureWick upon PT entry to room.     General Precautions: Standard, fall  Orthopedic Precautions: RLE weight bearing as tolerated  Braces: N/A  Respiratory Status: Room air     Functional Mobility:  Bed Mobility:     Supine to Sit: minimum assistance  Transfers:     Sit to Stand:  contact guard assistance with rolling walker  Gait: 70 ft with RW and CGA  Balance: good      AM-PAC 6 CLICK MOBILITY  Turning over in bed (including adjusting bedclothes, sheets and blankets)?: 3  Sitting down on and standing up from a chair with arms (e.g., wheelchair, bedside commode, etc.): 3  Moving from lying on back to sitting on the side of the  bed?: 3  Moving to and from a bed to a chair (including a wheelchair)?: 3  Need to walk in hospital room?: 3  Climbing 3-5 steps with a railing?: 3  Basic Mobility Total Score: 18       Treatment & Education:  Mobility as noted  Avoid:  -Hip flexion past 90 degrees  -Hip IR/Toe in  -Hip adduction past midline      Patient left up in chair with all lines intact, call button in reach, and nurse notified..    GOALS:   Multidisciplinary Problems       Physical Therapy Goals          Problem: Physical Therapy    Goal Priority Disciplines Outcome Goal Variances Interventions   Physical Therapy Goal     PT, PT/OT Progressing     Description: Short term goals:  Pt will perform supine to sit with minimum assistance  Pt will perform sit to stand with minimum assistance  Pt will independently verbalize hip precautions  Pt will ambulate 50 ft with minimum assistance using rolling walker    Long term goals:  Pt will perform supine to sit with contact guard assistance  Pt will perform sit to stand with contact guard assistance  Pt will ambulate 100 ft with contact guard assistance using rolling walker                         Time Tracking:     PT Received On: 08/28/24  PT Start Time: 0924     PT Stop Time: 0948  PT Total Time (min): 24 min     Billable Minutes: Gait Training 15 and Therapeutic Activity 9    Treatment Type: Treatment  PT/PTA: PT     Number of PTA visits since last PT visit: 0     08/28/2024

## 2024-08-28 NOTE — PT/OT/SLP PROGRESS
Physical Therapy Treatment    Patient Name:  January Santillan   MRN:  [id number]    Recommendations:     Discharge Recommendations: Moderate Intensity Therapy  Discharge Equipment Recommendations: walker, rolling  Barriers to discharge: None    Assessment:     January Santillan is a 69 y.o. female admitted with a medical diagnosis of Osteoarthritis of right hip.  She presents with the following impairments/functional limitations: weakness, impaired endurance, impaired functional mobility, pain .    Rehab Prognosis: Good; patient would benefit from acute skilled PT services to address these deficits and reach maximum level of function.    Recent Surgery: Procedure(s) (LRB):  ROBOTIC ARTHROPLASTY,HIP (Right) 2 Days Post-Op    Plan:     During this hospitalization, patient to be seen 5 x/week (X2 daily) to address the identified rehab impairments via therapeutic exercises and progress toward the following goals:    Plan of Care Expires:  09/26/24    Subjective     Chief Complaint: R hip pain  Patient/Family Comments/goals: agreeable to tx  Pain/Comfort:  Pain Rating 1: 5/10  Location - Side 1: Right  Location 1: hip      Objective:     Communicated with nurse prior to session.  Patient found supine with PureWick upon PT entry to room.     General Precautions: Standard, fall  Orthopedic Precautions: RLE weight bearing as tolerated  Braces: N/A  Respiratory Status: Room air     Functional Mobility:  NT      AM-PAC 6 CLICK MOBILITY  Turning over in bed (including adjusting bedclothes, sheets and blankets)?: 3  Sitting down on and standing up from a chair with arms (e.g., wheelchair, bedside commode, etc.): 3  Moving from lying on back to sitting on the side of the bed?: 3  Moving to and from a bed to a chair (including a wheelchair)?: 3  Need to walk in hospital room?: 3  Climbing 3-5 steps with a railing?: 3  Basic Mobility Total Score: 18       Treatment & Education:  -AP, QS, SLR, Hip add/abduction, LE flexion/ext; all 3x10  repetitions  Avoid:  -Hip flexion past 90 degrees  -Hip IR/Toe in  -Hip adduction past midline      Patient left supine with all lines intact..    GOALS:   Multidisciplinary Problems       Physical Therapy Goals          Problem: Physical Therapy    Goal Priority Disciplines Outcome Goal Variances Interventions   Physical Therapy Goal     PT, PT/OT Progressing     Description: Short term goals:  Pt will perform supine to sit with minimum assistance  Pt will perform sit to stand with minimum assistance  Pt will independently verbalize hip precautions  Pt will ambulate 50 ft with minimum assistance using rolling walker    Long term goals:  Pt will perform supine to sit with contact guard assistance  Pt will perform sit to stand with contact guard assistance  Pt will ambulate 100 ft with contact guard assistance using rolling walker                         Time Tracking:     PT Received On: 08/28/24  PT Start Time: 1620     PT Stop Time: 1635  PT Total Time (min): 15 min     Billable Minutes: Therapeutic Exercise 15    Treatment Type: Treatment  PT/PTA: PT     Number of PTA visits since last PT visit: 0     08/28/2024

## 2024-08-28 NOTE — ASSESSMENT & PLAN NOTE
"Patient's FSGs are controlled on current medication regimen.  Last A1c reviewed-   Lab Results   Component Value Date    HGBA1C 7.3 (H) 08/26/2024     Most recent fingerstick glucose reviewed- No results for input(s): "POCTGLUCOSE" in the last 24 hours.  Current correctional scale  Low  Maintain anti-hyperglycemic dose as follows-   Antihyperglycemics (From admission, onward)      Start     Stop Route Frequency Ordered    08/26/24 1715  metFORMIN tablet 1,000 mg         -- Oral 2 times daily with meals 08/26/24 1130    08/26/24 1353  insulin aspart U-100 injection 0-5 Units         -- SubQ Before meals & nightly PRN 08/26/24 1255    08/26/24 1145  dapagliflozin propanediol tablet 5 mg        Question Answer Comment   Does this patient have a diagnosis of heart failure? Yes    Does this patient have type 1 diabetes or diabetic ketoacidosis? No    Does this patient have symptomatic hypotension? No    Is the patient NPO or pending major surgery in next 3 days or less? No        -- Oral Daily 08/26/24 1130    08/26/24 1145  liraglutide 0.6 mg/0.1 mL (18 mg/3 mL) subq PNIJ pen 0.6 mg         -- SubQ Daily 08/26/24 1130          Hold Oral hypoglycemics while patient is in the hospital.  Accuchecks 123-208 in last 24 hours  "

## 2024-08-28 NOTE — SUBJECTIVE & OBJECTIVE
Interval History:  No acute events overnight.  Patient is currently resting comfortably in no distress.  She is afebrile and vital signs are stable.      Objective:     Vital Signs (Most Recent):  Temp: 98.1 °F (36.7 °C) (08/28/24 0756)  Pulse: 75 (08/28/24 0756)  Resp: 18 (08/28/24 0756)  BP: (!) 101/58 (08/28/24 0756)  SpO2: (!) 93 % (08/28/24 0756) Vital Signs (24h Range):  Temp:  [97.4 °F (36.3 °C)-100 °F (37.8 °C)] 98.1 °F (36.7 °C)  Pulse:  [71-80] 75  Resp:  [16-20] 18  SpO2:  [92 %-95 %] 93 %  BP: (101-136)/(58-77) 101/58     Weight: 64.3 kg (141 lb 12.1 oz)  Body mass index is 25.93 kg/m².    No intake or output data in the 24 hours ending 08/28/24 0854       Physical Exam  Constitutional:       General: She is not in acute distress.     Appearance: Normal appearance. She is not ill-appearing.   HENT:      Head: Normocephalic and atraumatic.      Right Ear: External ear normal.      Left Ear: External ear normal.      Nose: Nose normal.      Mouth/Throat:      Mouth: Mucous membranes are moist.      Pharynx: Oropharynx is clear.   Eyes:      Extraocular Movements: Extraocular movements intact.      Conjunctiva/sclera: Conjunctivae normal.      Pupils: Pupils are equal, round, and reactive to light.   Cardiovascular:      Rate and Rhythm: Normal rate and regular rhythm.      Pulses: Normal pulses.      Heart sounds: Normal heart sounds.   Pulmonary:      Effort: Pulmonary effort is normal. No respiratory distress.      Breath sounds: Normal breath sounds. No wheezing or rales.   Abdominal:      General: Abdomen is flat. Bowel sounds are normal.      Palpations: Abdomen is soft.   Musculoskeletal:      Cervical back: Normal range of motion and neck supple.      Right lower leg: No edema.      Left lower leg: No edema.      Comments: R hip post-operative changes     Skin:     General: Skin is warm and dry.      Coloration: Skin is not pale.   Neurological:      General: No focal deficit present.      Mental  Status: She is alert and oriented to person, place, and time.      Cranial Nerves: No cranial nerve deficit.      Motor: No weakness.   Psychiatric:         Mood and Affect: Mood normal.         Behavior: Behavior normal.           Review of Systems    Vents:       Lines/Drains/Airways       Peripheral Intravenous Line  Duration                  Peripheral IV - Single Lumen 08/26/24 0610 22 G Left Antecubital 2 days                    Significant Labs:    CBC/Anemia Profile:  Recent Labs   Lab 08/27/24  0405   WBC 9.63   HGB 9.6*   HCT 30.0*      MCV 92.3   RDW 13.8        Chemistries:  Recent Labs   Lab 08/27/24  0405      K 4.6      CO2 26   BUN 27*   CREATININE 1.97*   CALCIUM 8.4*       All pertinent labs within the past 24 hours have been reviewed.    Significant Imaging:  I have reviewed all pertinent imaging results/findings within the past 24 hours.

## 2024-08-28 NOTE — ASSESSMENT & PLAN NOTE
Chronic, controlled. Latest blood pressure and vitals reviewed-     Temp:  [97.4 °F (36.3 °C)-100 °F (37.8 °C)]   Pulse:  [71-80]   Resp:  [16-20]   BP: (101-136)/(58-77)   SpO2:  [92 %-95 %] .   Home meds for hypertension were reviewed and noted below.   Hypertension Medications               atenoloL-chlorthalidone (TENORETIC) 50-25 mg Tab atenolol 50 mg-chlorthalidone 25 mg tablet   TAKE 1 TABLET BY MOUTH ONCE DAILY    furosemide (LASIX) 20 MG tablet furosemide 20 mg tablet   TAKE 1 TABLET BY MOUTH ONCE DAILY    lisinopriL 10 MG tablet lisinopril 10 mg tablet   Take 1 tablet by mouth once daily            While in the hospital, will manage blood pressure as follows; Continue home antihypertensive regimen    Will utilize p.r.n. blood pressure medication only if patient's blood pressure greater than 180/110 and she develops symptoms such as worsening chest pain or shortness of breath.  BP looks ok currently

## 2024-08-28 NOTE — PLAN OF CARE
Problem: Adult Inpatient Plan of Care  Goal: Plan of Care Review  8/28/2024 0518 by Khia Galan RN  Outcome: Progressing  8/27/2024 1940 by Khai Galan RN  Outcome: Progressing  Goal: Patient-Specific Goal (Individualized)  8/28/2024 0518 by Khai Galan RN  Outcome: Progressing  8/27/2024 1940 by Khai Galan RN  Outcome: Progressing  Goal: Absence of Hospital-Acquired Illness or Injury  8/28/2024 0518 by Khai Galan RN  Outcome: Progressing  8/27/2024 1940 by Khai Galan RN  Outcome: Progressing  Goal: Optimal Comfort and Wellbeing  8/28/2024 0518 by Khai Galan RN  Outcome: Progressing  8/27/2024 1940 by Khai Galan RN  Outcome: Progressing  Goal: Readiness for Transition of Care  8/28/2024 0518 by Khai Galan RN  Outcome: Progressing  8/27/2024 1940 by Khai Galan RN  Outcome: Progressing     Problem: Diabetes Comorbidity  Goal: Blood Glucose Level Within Targeted Range  8/28/2024 0518 by Khai Galan RN  Outcome: Progressing  8/27/2024 1940 by Khai Galan RN  Outcome: Progressing     Problem: Wound  Goal: Optimal Coping  8/28/2024 0518 by Khai Galan RN  Outcome: Progressing  8/27/2024 1940 by Khai Galan RN  Outcome: Progressing  Goal: Optimal Functional Ability  8/28/2024 0518 by Khai Galan RN  Outcome: Progressing  8/27/2024 1940 by Khai Galan RN  Outcome: Progressing  Goal: Absence of Infection Signs and Symptoms  8/28/2024 0518 by Khai Galan RN  Outcome: Progressing  8/27/2024 1940 by Khai Galan RN  Outcome: Progressing  Goal: Improved Oral Intake  8/28/2024 0518 by Khai Galan RN  Outcome: Progressing  8/27/2024 1940 by Khai Galan RN  Outcome: Progressing  Goal: Optimal Pain Control and Function  8/28/2024 0518 by Khai Galan RN  Outcome: Progressing  8/27/2024 1940 by Khai Galan RN  Outcome: Progressing  Goal: Skin Health and Integrity  8/28/2024 0518 by Adama,  TALIA Ridley  Outcome: Progressing  8/27/2024 1940 by Khai Galan RN  Outcome: Progressing  Goal: Optimal Wound Healing  8/28/2024 0518 by Khai Galan RN  Outcome: Progressing  8/27/2024 1940 by Khai Galan RN  Outcome: Progressing     Problem: Infection  Goal: Absence of Infection Signs and Symptoms  8/28/2024 0518 by Khai Galan RN  Outcome: Progressing  8/27/2024 1940 by Khai Galan RN  Outcome: Progressing     Problem: Skin Injury Risk Increased  Goal: Skin Health and Integrity  8/28/2024 0518 by Khai Galan RN  Outcome: Progressing  8/27/2024 1940 by Khai Galan RN  Outcome: Progressing     Problem: Pain Acute  Goal: Optimal Pain Control and Function  8/28/2024 0518 by Khai Galan RN  Outcome: Progressing  8/27/2024 1940 by Khai Galan RN  Outcome: Progressing     Problem: Fall Injury Risk  Goal: Absence of Fall and Fall-Related Injury  8/28/2024 0518 by Khai Galan RN  Outcome: Progressing  8/27/2024 1940 by Khai Galan RN  Outcome: Progressing

## 2024-08-28 NOTE — PROGRESS NOTES
[facility]  Pulmonology  Progress Note    Patient Name: January Santillan  MRN: [id number]  Admission Date: 8/26/2024  Hospital Length of Stay: 1 days  Code Status: Full Code  Attending Provider: Evert López MD  Primary Care Provider: Trang Miller FNP-C   Principal Problem: Osteoarthritis of right hip    Subjective:     Interval History:  No acute events overnight.  Patient is currently resting comfortably in no distress.  She is afebrile and vital signs are stable.      Objective:     Vital Signs (Most Recent):  Temp: 98.1 °F (36.7 °C) (08/28/24 0756)  Pulse: 75 (08/28/24 0756)  Resp: 18 (08/28/24 0756)  BP: (!) 101/58 (08/28/24 0756)  SpO2: (!) 93 % (08/28/24 0756) Vital Signs (24h Range):  Temp:  [97.4 °F (36.3 °C)-100 °F (37.8 °C)] 98.1 °F (36.7 °C)  Pulse:  [71-80] 75  Resp:  [16-20] 18  SpO2:  [92 %-95 %] 93 %  BP: (101-136)/(58-77) 101/58     Weight: 64.3 kg (141 lb 12.1 oz)  Body mass index is 25.93 kg/m².    No intake or output data in the 24 hours ending 08/28/24 0854       Physical Exam  Constitutional:       General: She is not in acute distress.     Appearance: Normal appearance. She is not ill-appearing.   HENT:      Head: Normocephalic and atraumatic.      Right Ear: External ear normal.      Left Ear: External ear normal.      Nose: Nose normal.      Mouth/Throat:      Mouth: Mucous membranes are moist.      Pharynx: Oropharynx is clear.   Eyes:      Extraocular Movements: Extraocular movements intact.      Conjunctiva/sclera: Conjunctivae normal.      Pupils: Pupils are equal, round, and reactive to light.   Cardiovascular:      Rate and Rhythm: Normal rate and regular rhythm.      Pulses: Normal pulses.      Heart sounds: Normal heart sounds.   Pulmonary:      Effort: Pulmonary effort is normal. No respiratory distress.      Breath sounds: Normal breath sounds. No wheezing or rales.   Abdominal:      General: Abdomen is flat. Bowel sounds are normal.      Palpations: Abdomen is  soft.   Musculoskeletal:      Cervical back: Normal range of motion and neck supple.      Right lower leg: No edema.      Left lower leg: No edema.      Comments: R hip post-operative changes     Skin:     General: Skin is warm and dry.      Coloration: Skin is not pale.   Neurological:      General: No focal deficit present.      Mental Status: She is alert and oriented to person, place, and time.      Cranial Nerves: No cranial nerve deficit.      Motor: No weakness.   Psychiatric:         Mood and Affect: Mood normal.         Behavior: Behavior normal.           Review of Systems    Vents:       Lines/Drains/Airways       Peripheral Intravenous Line  Duration                  Peripheral IV - Single Lumen 08/26/24 0610 22 G Left Antecubital 2 days                    Significant Labs:    CBC/Anemia Profile:  Recent Labs   Lab 08/27/24  0405   WBC 9.63   HGB 9.6*   HCT 30.0*      MCV 92.3   RDW 13.8        Chemistries:  Recent Labs   Lab 08/27/24  0405      K 4.6      CO2 26   BUN 27*   CREATININE 1.97*   CALCIUM 8.4*       All pertinent labs within the past 24 hours have been reviewed.    Significant Imaging:  I have reviewed all pertinent imaging results/findings within the past 24 hours.  Assessment/Plan:     Cardiac/Vascular  Dyslipidemia  Stable-currently not on a statin  OK to resume home med at discharge    Essential hypertension  Chronic, controlled. Latest blood pressure and vitals reviewed-     Temp:  [97.4 °F (36.3 °C)-100 °F (37.8 °C)]   Pulse:  [71-80]   Resp:  [16-20]   BP: (101-136)/(58-77)   SpO2:  [92 %-95 %] .   Home meds for hypertension were reviewed and noted below.   Hypertension Medications               atenoloL-chlorthalidone (TENORETIC) 50-25 mg Tab atenolol 50 mg-chlorthalidone 25 mg tablet   TAKE 1 TABLET BY MOUTH ONCE DAILY    furosemide (LASIX) 20 MG tablet furosemide 20 mg tablet   TAKE 1 TABLET BY MOUTH ONCE DAILY    lisinopriL 10 MG tablet lisinopril 10 mg tablet    "Take 1 tablet by mouth once daily            While in the hospital, will manage blood pressure as follows; Continue home antihypertensive regimen    Will utilize p.r.n. blood pressure medication only if patient's blood pressure greater than 180/110 and she develops symptoms such as worsening chest pain or shortness of breath.  BP looks ok currently    Endocrine  Type 2 diabetes mellitus without complication, without long-term current use of insulin  Patient's FSGs are controlled on current medication regimen.  Last A1c reviewed-   Lab Results   Component Value Date    HGBA1C 7.3 (H) 08/26/2024     Most recent fingerstick glucose reviewed- No results for input(s): "POCTGLUCOSE" in the last 24 hours.  Current correctional scale  Low  Maintain anti-hyperglycemic dose as follows-   Antihyperglycemics (From admission, onward)      Start     Stop Route Frequency Ordered    08/26/24 1715  metFORMIN tablet 1,000 mg         -- Oral 2 times daily with meals 08/26/24 1130    08/26/24 1353  insulin aspart U-100 injection 0-5 Units         -- SubQ Before meals & nightly PRN 08/26/24 1255    08/26/24 1145  dapagliflozin propanediol tablet 5 mg        Question Answer Comment   Does this patient have a diagnosis of heart failure? Yes    Does this patient have type 1 diabetes or diabetic ketoacidosis? No    Does this patient have symptomatic hypotension? No    Is the patient NPO or pending major surgery in next 3 days or less? No        -- Oral Daily 08/26/24 1130    08/26/24 1145  liraglutide 0.6 mg/0.1 mL (18 mg/3 mL) subq PNIJ pen 0.6 mg         -- SubQ Daily 08/26/24 1130          Hold Oral hypoglycemics while patient is in the hospital.  Accuchecks 123-208 in last 24 hours    GI  Gastroesophageal reflux disease  Stable- currently has famotidine    Orthopedic  Right hip pain  S/P right hip arthroplasty  Defer any changes in care to orthopedics  She is doing well and ok to go to swingbed from my standpoint             "     Bipin Cuevas, DO  Pulmonology  [facility]

## 2024-08-28 NOTE — PT/OT/SLP PROGRESS
Occupational Therapy   Treatment    Name: January Santillan  MRN: [id number]  Admitting Diagnosis:  Osteoarthritis of right hip  2 Days Post-Op    Recommendations:     Discharge Recommendations: High Intensity Therapy  Discharge Equipment Recommendations:  walker, rolling  Barriers to discharge:       Assessment:     January Santillan is a 69 y.o. female with a medical diagnosis of Osteoarthritis of right hip.  She presents with alert and agreeable to treatment. Performance deficits affecting function are weakness, impaired endurance, gait instability, impaired self care skills, pain, orthopedic precautions.     Rehab Prognosis:  Good; patient would benefit from acute skilled OT services to address these deficits and reach maximum level of function.       Plan:     Patient to be seen 5 x/week to address the above listed problems via self-care/home management, therapeutic activities, therapeutic exercises  Plan of Care Expires: 09/30/24  Plan of Care Reviewed with: patient, spouse    Subjective     Chief Complaint: Osteoarthritis of right hip    Patient/Family Comments/goals: Pt is awaiting swingbed placement  Pain/Comfort:  Pain Rating 1: 6/10  Location - Side 1: Right  Location 1: hip  Pain Addressed 1: Pre-medicate for activity, Distraction, Cessation of Activity  Pain Rating Post-Intervention 1: 6/10    Objective:     Communicated with: TALIA Giron prior to session.  Patient found up in chair with PureWick upon OT entry to room.    General Precautions: Standard, fall    Orthopedic Precautions:RLE weight bearing as tolerated, RLE posterior precautions  Braces: N/A  Respiratory Status: Room air     Occupational Performance:     Bed Mobility:    NT    Functional Mobility/Transfers:  Patient completed Sit <> Stand Transfer with minimum assistance  with  rolling walker   Functional Mobility: NT    Activities of Daily Living:  Bathing: Pt bathed with setup for upper body, with min(A) to wash distal LE due to hip precautions, Pt  stood and washed her perineum with CGA    Upper Body Dressing: stand by assistance and setup  Lower Body Dressing: minimum assistance with reacher and with sockaid      AMPAC 6 Click ADL: 21    Treatment & Education:  Pt was able to state 2/3 THR posterior precautions independently. Pt was able to answer questions correctly about the 3rd precaution. Pt demonstrated carry over of using AD oliva LE dressing from her previous treatment sessions. Pt demonstrated good compliance with posterior THR precautions during ADLs.    Patient left up in chair with all lines intact, call button in reach, and RN notified    GOALS:   Multidisciplinary Problems       Occupational Therapy Goals          Problem: Occupational Therapy    Goal Priority Disciplines Outcome Interventions   Occupational Therapy Goal     OT, PT/OT Progressing    Description:   STG: (in 1 week)  Pt will bathe with min(A)  with bath sponge  Pt will perform UE dressing with setup  Pt will perform LE dressing with min(A) using reacher and sockaid  Pt will state and adhere to THR precautions during self care and mobility  Pt will t/f bed/chair/bsc with CGA using RW  Perform standing task x 2 min with CGA  using RW  Tolerate 15 min of tx without fatigue.    LTG: in 6 weeks  Restore to max I with selfcare and mobility.                          Time Tracking:     OT Date of Treatment: 08/28/24  OT Start Time: 1101  OT Stop Time: 1145  OT Total Time (min): 44 min    Billable Minutes:Self Care/Home Management 44 min    OT/INDIO: OT          8/28/2024

## 2024-08-28 NOTE — PLAN OF CARE
MAGGY faxed updated therapy note from yesterday and progress note. SW awaiting insurance approval. MAGGY following.     1213: MAGGY faxed updated therapy note to Carito at [facility]. MAGGY informed MD that insurance is still pending. MAGGY following.     1400: MAGGY spoke with Carito at [facility] and pt is approved and accepted for swb on tomorrow. MAGGY informed, MD, pt and nurse of approval and 1400 cut off time tomorrow. Pkt started, MAGGY will fax tb skin test and s/s. SW following.

## 2024-08-29 ENCOUNTER — TELEPHONE (OUTPATIENT)
Dept: ORTHOPEDICS | Facility: CLINIC | Age: 69
End: 2024-08-29
Payer: MEDICARE

## 2024-08-29 VITALS
HEIGHT: 62 IN | TEMPERATURE: 97 F | RESPIRATION RATE: 18 BRPM | WEIGHT: 141.75 LBS | OXYGEN SATURATION: 97 % | DIASTOLIC BLOOD PRESSURE: 75 MMHG | HEART RATE: 62 BPM | SYSTOLIC BLOOD PRESSURE: 114 MMHG | BODY MASS INDEX: 26.09 KG/M2

## 2024-08-29 LAB
ESTROGEN SERPL-MCNC: NORMAL PG/ML
GLUCOSE SERPL-MCNC: 105 MG/DL (ref 70–105)
GLUCOSE SERPL-MCNC: 119 MG/DL (ref 70–105)
INSULIN SERPL-ACNC: NORMAL U[IU]/ML
LAB AP GROSS DESCRIPTION: NORMAL
LAB AP LABORATORY NOTES: NORMAL
T3RU NFR SERPL: NORMAL %

## 2024-08-29 PROCEDURE — 97110 THERAPEUTIC EXERCISES: CPT

## 2024-08-29 PROCEDURE — 25000003 PHARM REV CODE 250: Performed by: ORTHOPAEDIC SURGERY

## 2024-08-29 PROCEDURE — 99232 SBSQ HOSP IP/OBS MODERATE 35: CPT | Mod: ,,, | Performed by: INTERNAL MEDICINE

## 2024-08-29 PROCEDURE — 82962 GLUCOSE BLOOD TEST: CPT

## 2024-08-29 RX ADMIN — ATENOLOL 50 MG: 25 TABLET ORAL at 09:08

## 2024-08-29 RX ADMIN — FAMOTIDINE 20 MG: 20 TABLET, FILM COATED ORAL at 09:08

## 2024-08-29 RX ADMIN — FUROSEMIDE 20 MG: 20 TABLET ORAL at 09:08

## 2024-08-29 RX ADMIN — CHLORTHALIDONE 25 MG: 25 TABLET ORAL at 09:08

## 2024-08-29 RX ADMIN — METFORMIN HYDROCHLORIDE 1000 MG: 500 TABLET ORAL at 07:08

## 2024-08-29 RX ADMIN — MUPIROCIN 1 G: 20 OINTMENT TOPICAL at 09:08

## 2024-08-29 RX ADMIN — DOCUSATE SODIUM 100 MG: 100 CAPSULE, LIQUID FILLED ORAL at 09:08

## 2024-08-29 RX ADMIN — CITALOPRAM HYDROBROMIDE 20 MG: 20 TABLET ORAL at 09:08

## 2024-08-29 RX ADMIN — LISINOPRIL 10 MG: 10 TABLET ORAL at 09:08

## 2024-08-29 RX ADMIN — PREGABALIN 75 MG: 75 CAPSULE ORAL at 09:08

## 2024-08-29 RX ADMIN — POTASSIUM CHLORIDE 20 MEQ: 1500 TABLET, EXTENDED RELEASE ORAL at 09:08

## 2024-08-29 RX ADMIN — POLYETHYLENE GLYCOL 3350 17 G: 17 POWDER, FOR SOLUTION ORAL at 09:08

## 2024-08-29 RX ADMIN — ASPIRIN 81 MG CHEWABLE TABLET 324 MG: 81 TABLET CHEWABLE at 09:08

## 2024-08-29 NOTE — PLAN OF CARE
[facility]  Discharge Final Note    Primary Care Provider: Trang Miller FNP-C    Expected Discharge Date: 8/27/2024    Final Discharge Note (most recent)       Final Note - 08/29/24 1142          Final Note    Assessment Type Final Discharge Note     Anticipated Discharge Disposition Skilled Nursing Facility     What phone number can be called within the next 1-3 days to see how you are doing after discharge? [phone]        Post-Acute Status    Post-Acute Authorization Placement     Post-Acute Placement Status Set-up Complete/Auth obtained     Patient choice form signed by patient/caregiver List with quality metrics by geographic area provided;List from CMS Compare     Discharge Delays None known at this time                     Important Message from Medicare  Important Message from Medicare regarding Discharge Appeal Rights: Explained to patient/caregiver, Signed/date by patient/caregiver     Date IMM was signed: 08/28/24  Time IMM was signed: 1400     Follow-up providers       Laura Macias FNP   Specialty: Family Medicine, Emergency Medicine, Orthopedic Surgery    [address] [place] [address]   Phone: [phone]       Next Steps: Follow up in 13 day(s)    Instructions: Please follow up on September 9 at 8:20              After-discharge care                Destination       [facility]   Service: Skilled Nursing    [address] [place] [address] [place] [address]   Phone: [phone]                             Pt discharged to [facility] today. Pkt was left in cubby this a.m. SW faxed dc orders, s/s and tb skin test. No other needs.

## 2024-08-29 NOTE — PLAN OF CARE
Patient discharging to [facility] for therapy services.   at bedside, discharge instruction reviewed, verbalization of understanding noted.

## 2024-08-29 NOTE — SUBJECTIVE & OBJECTIVE
Interval History:  No acute events overnight.  Patient is currently resting comfortably in no distress.  She is afebrile and vital signs are stable.      Objective:     Vital Signs (Most Recent):  Temp: 97 °F (36.1 °C) (08/29/24 0802)  Pulse: 63 (08/29/24 0802)  Resp: 18 (08/29/24 0802)  BP: (!) 113/55 (08/29/24 0802)  SpO2: 99 % (08/29/24 0448) Vital Signs (24h Range):  Temp:  [97 °F (36.1 °C)-98.2 °F (36.8 °C)] 97 °F (36.1 °C)  Pulse:  [63-74] 63  Resp:  [16-18] 18  SpO2:  [95 %-99 %] 99 %  BP: ()/(50-68) 113/55     Weight: 64.3 kg (141 lb 12.1 oz)  Body mass index is 25.93 kg/m².    No intake or output data in the 24 hours ending 08/29/24 0914       Physical Exam  Constitutional:       General: She is not in acute distress.     Appearance: Normal appearance. She is not ill-appearing.   HENT:      Head: Normocephalic and atraumatic.      Right Ear: External ear normal.      Left Ear: External ear normal.      Nose: Nose normal.      Mouth/Throat:      Mouth: Mucous membranes are moist.      Pharynx: Oropharynx is clear.   Eyes:      Extraocular Movements: Extraocular movements intact.      Conjunctiva/sclera: Conjunctivae normal.      Pupils: Pupils are equal, round, and reactive to light.   Cardiovascular:      Rate and Rhythm: Normal rate and regular rhythm.      Pulses: Normal pulses.      Heart sounds: Normal heart sounds.   Pulmonary:      Effort: Pulmonary effort is normal. No respiratory distress.      Breath sounds: Normal breath sounds. No wheezing or rales.   Abdominal:      General: Abdomen is flat. Bowel sounds are normal.      Palpations: Abdomen is soft.   Musculoskeletal:      Cervical back: Normal range of motion and neck supple.      Right lower leg: No edema.      Left lower leg: No edema.      Comments: R hip post-operative changes     Skin:     General: Skin is warm and dry.      Coloration: Skin is not pale.   Neurological:      General: No focal deficit present.      Mental Status: She  "is alert and oriented to person, place, and time.      Cranial Nerves: No cranial nerve deficit.      Motor: No weakness.   Psychiatric:         Mood and Affect: Mood normal.         Behavior: Behavior normal.           Review of Systems    Vents:       Lines/Drains/Airways       Peripheral Intravenous Line  Duration                  Peripheral IV - Single Lumen 08/26/24 0610 22 G Left Antecubital 3 days                    Significant Labs:    CBC/Anemia Profile:  No results for input(s): "WBC", "HGB", "HCT", "PLT", "MCV", "RDW", "IRON", "FERRITIN", "RETIC", "FOLATE", "[id number]", "OCCULTBLOOD" in the last 48 hours.       Chemistries:  No results for input(s): "NA", "K", "CL", "CO2", "BUN", "CREATININE", "CALCIUM", "ALBUMIN", "PROT", "BILITOT", "ALKPHOS", "ALT", "AST", "GLUCOSE", "MG", "PHOS" in the last 48 hours.      All pertinent labs within the past 24 hours have been reviewed.    Significant Imaging:  I have reviewed all pertinent imaging results/findings within the past 24 hours.  "

## 2024-08-29 NOTE — PT/OT/SLP PROGRESS
Physical Therapy Treatment    Patient Name:  January Santillan   MRN:  [id number]    Recommendations:     Discharge Recommendations: Moderate Intensity Therapy  Discharge Equipment Recommendations: walker, rolling  Barriers to discharge: Inaccessible home and Decreased caregiver support    Assessment:     January Santillan is a 69 y.o. female admitted with a medical diagnosis of Osteoarthritis of right hip.  She presents with the following impairments/functional limitations: weakness, impaired endurance, impaired functional mobility, pain Pt very drowsy today. Needed increased assistance to transfer to chair. Plans for swb today.    Rehab Prognosis: Good; patient would benefit from acute skilled PT services to address these deficits and reach maximum level of function.    Recent Surgery: Procedure(s) (LRB):  ROBOTIC ARTHROPLASTY,HIP (Right) 3 Days Post-Op    Plan:     During this hospitalization, patient to be seen 5 x/week (X2 daily) to address the identified rehab impairments via therapeutic exercises and progress toward the following goals:    Plan of Care Expires:  09/26/24    Subjective     Chief Complaint: right total hip replacement   Patient/Family Comments/goals: Pt is agreeable to PT   Pain/Comfort:  Pain Rating 1: 5/10  Location - Side 1: Right  Location 1: hip  Pain Addressed 1: Pre-medicate for activity  Pain Rating Post-Intervention 1: 5/10      Objective:     Communicated with VARINDER Arriaga RN prior to session.  Patient found HOB elevated with peripheral IV upon PT entry to room.     General Precautions: Standard, fall  Orthopedic Precautions: RLE weight bearing as tolerated  Braces: N/A  Respiratory Status: Room air     Functional Mobility:  Bed Mobility:     Scooting: minimum assistance  Supine to Sit: minimum assistance  Transfers:     Sit to Stand:  moderate assistance with rolling walker  Bed to Chair: moderate assistance with  rolling walker  using  Step Transfer  Balance: fair      AM-PAC 6 CLICK  MOBILITY  Turning over in bed (including adjusting bedclothes, sheets and blankets)?: 3  Sitting down on and standing up from a chair with arms (e.g., wheelchair, bedside commode, etc.): 3  Moving from lying on back to sitting on the side of the bed?: 3  Moving to and from a bed to a chair (including a wheelchair)?: 3  Need to walk in hospital room?: 3  Climbing 3-5 steps with a railing?: 3  Basic Mobility Total Score: 18       Treatment & Education:  Pt performed right LE: bed level exercises: ankle pumps, quad sets, heel slides, and hip abduction x 15 each  Assisted to chair    Patient left up in chair with all lines intact and call button in reach..    GOALS:   Multidisciplinary Problems       Physical Therapy Goals          Problem: Physical Therapy    Goal Priority Disciplines Outcome Goal Variances Interventions   Physical Therapy Goal     PT, PT/OT Progressing     Description: Short term goals:  Pt will perform supine to sit with minimum assistance  Pt will perform sit to stand with minimum assistance  Pt will independently verbalize hip precautions  Pt will ambulate 50 ft with minimum assistance using rolling walker    Long term goals:  Pt will perform supine to sit with contact guard assistance  Pt will perform sit to stand with contact guard assistance  Pt will ambulate 100 ft with contact guard assistance using rolling walker                         Time Tracking:     PT Received On: 08/29/24  PT Start Time: 0947     PT Stop Time: 1004  PT Total Time (min): 17 min     Billable Minutes: Therapeutic Exercise 17    Treatment Type: Treatment  PT/PTA: PT     Number of PTA visits since last PT visit: 0     08/29/2024

## 2024-08-29 NOTE — ASSESSMENT & PLAN NOTE
Chronic, controlled. Latest blood pressure and vitals reviewed-     Temp:  [97 °F (36.1 °C)-98.2 °F (36.8 °C)]   Pulse:  [63-74]   Resp:  [16-18]   BP: ()/(50-68)   SpO2:  [95 %-99 %] .   Home meds for hypertension were reviewed and noted below.   Hypertension Medications               atenoloL-chlorthalidone (TENORETIC) 50-25 mg Tab atenolol 50 mg-chlorthalidone 25 mg tablet   TAKE 1 TABLET BY MOUTH ONCE DAILY    furosemide (LASIX) 20 MG tablet furosemide 20 mg tablet   TAKE 1 TABLET BY MOUTH ONCE DAILY    lisinopriL 10 MG tablet lisinopril 10 mg tablet   Take 1 tablet by mouth once daily            While in the hospital, will manage blood pressure as follows; Continue home antihypertensive regimen    Will utilize p.r.n. blood pressure medication only if patient's blood pressure greater than 180/110 and she develops symptoms such as worsening chest pain or shortness of breath.  BP looks ok currently

## 2024-08-29 NOTE — PROGRESS NOTES
[facility]  Pulmonology  Progress Note    Patient Name: January Santillan  MRN: [id number]  Admission Date: 8/26/2024  Hospital Length of Stay: 2 days  Code Status: Full Code  Attending Provider: Evert López MD  Primary Care Provider: Trang Miller FNP-C   Principal Problem: Osteoarthritis of right hip    Subjective:     Interval History:  No acute events overnight.  Patient is currently resting comfortably in no distress.  She is afebrile and vital signs are stable.      Objective:     Vital Signs (Most Recent):  Temp: 97 °F (36.1 °C) (08/29/24 0802)  Pulse: 63 (08/29/24 0802)  Resp: 18 (08/29/24 0802)  BP: (!) 113/55 (08/29/24 0802)  SpO2: 99 % (08/29/24 0448) Vital Signs (24h Range):  Temp:  [97 °F (36.1 °C)-98.2 °F (36.8 °C)] 97 °F (36.1 °C)  Pulse:  [63-74] 63  Resp:  [16-18] 18  SpO2:  [95 %-99 %] 99 %  BP: ()/(50-68) 113/55     Weight: 64.3 kg (141 lb 12.1 oz)  Body mass index is 25.93 kg/m².    No intake or output data in the 24 hours ending 08/29/24 0914       Physical Exam  Constitutional:       General: She is not in acute distress.     Appearance: Normal appearance. She is not ill-appearing.   HENT:      Head: Normocephalic and atraumatic.      Right Ear: External ear normal.      Left Ear: External ear normal.      Nose: Nose normal.      Mouth/Throat:      Mouth: Mucous membranes are moist.      Pharynx: Oropharynx is clear.   Eyes:      Extraocular Movements: Extraocular movements intact.      Conjunctiva/sclera: Conjunctivae normal.      Pupils: Pupils are equal, round, and reactive to light.   Cardiovascular:      Rate and Rhythm: Normal rate and regular rhythm.      Pulses: Normal pulses.      Heart sounds: Normal heart sounds.   Pulmonary:      Effort: Pulmonary effort is normal. No respiratory distress.      Breath sounds: Normal breath sounds. No wheezing or rales.   Abdominal:      General: Abdomen is flat. Bowel sounds are normal.      Palpations: Abdomen is soft.  "  Musculoskeletal:      Cervical back: Normal range of motion and neck supple.      Right lower leg: No edema.      Left lower leg: No edema.      Comments: R hip post-operative changes     Skin:     General: Skin is warm and dry.      Coloration: Skin is not pale.   Neurological:      General: No focal deficit present.      Mental Status: She is alert and oriented to person, place, and time.      Cranial Nerves: No cranial nerve deficit.      Motor: No weakness.   Psychiatric:         Mood and Affect: Mood normal.         Behavior: Behavior normal.           Review of Systems    Vents:       Lines/Drains/Airways       Peripheral Intravenous Line  Duration                  Peripheral IV - Single Lumen 08/26/24 0610 22 G Left Antecubital 3 days                    Significant Labs:    CBC/Anemia Profile:  No results for input(s): "WBC", "HGB", "HCT", "PLT", "MCV", "RDW", "IRON", "FERRITIN", "RETIC", "FOLATE", "[id number]", "OCCULTBLOOD" in the last 48 hours.       Chemistries:  No results for input(s): "NA", "K", "CL", "CO2", "BUN", "CREATININE", "CALCIUM", "ALBUMIN", "PROT", "BILITOT", "ALKPHOS", "ALT", "AST", "GLUCOSE", "MG", "PHOS" in the last 48 hours.      All pertinent labs within the past 24 hours have been reviewed.    Significant Imaging:  I have reviewed all pertinent imaging results/findings within the past 24 hours.  Assessment/Plan:     Cardiac/Vascular  Dyslipidemia  Stable-currently not on a statin  OK to resume home med at discharge    Essential hypertension  Chronic, controlled. Latest blood pressure and vitals reviewed-     Temp:  [97 °F (36.1 °C)-98.2 °F (36.8 °C)]   Pulse:  [63-74]   Resp:  [16-18]   BP: ()/(50-68)   SpO2:  [95 %-99 %] .   Home meds for hypertension were reviewed and noted below.   Hypertension Medications               atenoloL-chlorthalidone (TENORETIC) 50-25 mg Tab atenolol 50 mg-chlorthalidone 25 mg tablet   TAKE 1 TABLET BY MOUTH ONCE DAILY    furosemide (LASIX) 20 MG " "tablet furosemide 20 mg tablet   TAKE 1 TABLET BY MOUTH ONCE DAILY    lisinopriL 10 MG tablet lisinopril 10 mg tablet   Take 1 tablet by mouth once daily            While in the hospital, will manage blood pressure as follows; Continue home antihypertensive regimen    Will utilize p.r.n. blood pressure medication only if patient's blood pressure greater than 180/110 and she develops symptoms such as worsening chest pain or shortness of breath.  BP looks ok currently    Endocrine  Type 2 diabetes mellitus without complication, without long-term current use of insulin  Patient's FSGs are controlled on current medication regimen.  Last A1c reviewed-   Lab Results   Component Value Date    HGBA1C 7.3 (H) 08/26/2024     Most recent fingerstick glucose reviewed- No results for input(s): "POCTGLUCOSE" in the last 24 hours.  Current correctional scale  Low  Maintain anti-hyperglycemic dose as follows-   Antihyperglycemics (From admission, onward)      Start     Stop Route Frequency Ordered    08/26/24 1715  metFORMIN tablet 1,000 mg         -- Oral 2 times daily with meals 08/26/24 1130    08/26/24 1353  insulin aspart U-100 injection 0-5 Units         -- SubQ Before meals & nightly PRN 08/26/24 1255    08/26/24 1145  dapagliflozin propanediol tablet 5 mg        Question Answer Comment   Does this patient have a diagnosis of heart failure? Yes    Does this patient have type 1 diabetes or diabetic ketoacidosis? No    Does this patient have symptomatic hypotension? No    Is the patient NPO or pending major surgery in next 3 days or less? No        -- Oral Daily 08/26/24 1130    08/26/24 1145  liraglutide 0.6 mg/0.1 mL (18 mg/3 mL) subq PNIJ pen 0.6 mg         -- SubQ Daily 08/26/24 1130          Hold Oral hypoglycemics while patient is in the hospital.  Accuchecks  in last 24 hours    Orthopedic  Right hip pain  S/P right hip arthroplasty  Defer any changes in care to orthopedics  She is doing well and ok to go to " swingbed from my standpoint                 Bipin Cuevas, DO  Pulmonology  [facility]

## 2024-08-29 NOTE — NURSING
Report called to Trang Hancock RN.  Patient will be transported via personal vehicle/ to [facility] facility.

## 2024-08-29 NOTE — TELEPHONE ENCOUNTER
Dr. López is going to send her some Eliquis.     ----- Message from Abby Velasquez sent at 8/29/2024  3:15 PM CDT -----  Regarding: allergy  Who Called:[place] nurse with [facility] Call back # [phone]    Caller is requesting assistance/information from provider's office.    Symptoms (please be specific): She have question about pt Rx aspirin 325  she have allergy toward aspirin, what can they substitute it with  How long has patient had these symptoms:    List of preferred pharmacies on file (remove unneeded): [unfilled]  If different, enter pharmacy into here including location and phone number:       Preferred Method of Contact: Phone Call  Patient's Preferred Phone Number on File: [phone]   Best Call Back Number, if different:  Additional Information:

## 2024-08-29 NOTE — ASSESSMENT & PLAN NOTE
"Patient's FSGs are controlled on current medication regimen.  Last A1c reviewed-   Lab Results   Component Value Date    HGBA1C 7.3 (H) 08/26/2024     Most recent fingerstick glucose reviewed- No results for input(s): "POCTGLUCOSE" in the last 24 hours.  Current correctional scale  Low  Maintain anti-hyperglycemic dose as follows-   Antihyperglycemics (From admission, onward)      Start     Stop Route Frequency Ordered    08/26/24 1715  metFORMIN tablet 1,000 mg         -- Oral 2 times daily with meals 08/26/24 1130    08/26/24 1353  insulin aspart U-100 injection 0-5 Units         -- SubQ Before meals & nightly PRN 08/26/24 1255    08/26/24 1145  dapagliflozin propanediol tablet 5 mg        Question Answer Comment   Does this patient have a diagnosis of heart failure? Yes    Does this patient have type 1 diabetes or diabetic ketoacidosis? No    Does this patient have symptomatic hypotension? No    Is the patient NPO or pending major surgery in next 3 days or less? No        -- Oral Daily 08/26/24 1130    08/26/24 1145  liraglutide 0.6 mg/0.1 mL (18 mg/3 mL) subq PNIJ pen 0.6 mg         -- SubQ Daily 08/26/24 1130          Hold Oral hypoglycemics while patient is in the hospital.  Accuchecks  in last 24 hours  "

## 2024-08-29 NOTE — PLAN OF CARE
Problem: Adult Inpatient Plan of Care  Goal: Plan of Care Review  Outcome: Progressing  Goal: Patient-Specific Goal (Individualized)  Outcome: Progressing  Goal: Absence of Hospital-Acquired Illness or Injury  Outcome: Progressing  Goal: Optimal Comfort and Wellbeing  Outcome: Progressing  Goal: Readiness for Transition of Care  Outcome: Progressing     Problem: Diabetes Comorbidity  Goal: Blood Glucose Level Within Targeted Range  Outcome: Progressing     Problem: Pain Acute  Goal: Optimal Pain Control and Function  Outcome: Progressing     Problem: Fall Injury Risk  Goal: Absence of Fall and Fall-Related Injury  Outcome: Progressing

## 2024-08-30 NOTE — DISCHARGE SUMMARY
[facility]  Discharge Note  Short Stay    Procedure(s) (LRB):  ROBOTIC ARTHROPLASTY,HIP (Right)      OUTCOME: Patient tolerated treatment/procedure well without complication and is now ready for discharge.  January Santillan was admitted following the aforementioned surgical procedure.  she received perioperative antibiotics over 48  hours following the surgery and participated in post operative physical therapy.  she was made familiar with the post-operative rehabilitation process and the need for DVT prophylaxis was discussed prior to discharged.  January Santillan was discharged safely having suffered no untoward events.     DISPOSITION: Home or Self Care    FINAL DIAGNOSIS:  Osteoarthritis of right hip    FOLLOWUP: In clinic    DISCHARGE INSTRUCTIONS:    Discharge Procedure Orders   Ambulatory referral/consult to Home Health   Standing Status: Future   Referral Priority: Routine Referral Type: Home Health Care   Referral Reason: Specialty Services Required   Requested Specialty: Home Health Services   Number of Visits Requested: 1     Diet general     Remove dressing in 48 hours     Change dressing (specify)   Order Comments: Dressing change:  On POD 3- recommend dressing change via Home Health     Call MD for:  temperature >100.4     Call MD for:  persistent nausea and vomiting     Call MD for:  severe uncontrolled pain     Call MD for:  difficulty breathing, headache or visual disturbances     Call MD for:  redness, tenderness, or signs of infection (pain, swelling, redness, odor or green/yellow discharge around incision site)     Call MD for:  hives     Call MD for:  persistent dizziness or light-headedness     Call MD for:  extreme fatigue         Clinical Reference Documents Added to Patient Instructions         Document    HEART HEALTHY DIET (ENGLISH)    HIP PAIN (ENGLISH)    OSTEOARTHRITIS DISCHARGE INSTRUCTIONS (ENGLISH)    TYPE 2 DIABETES (ENGLISH)            TIME SPENT ON DISCHARGE: 9  minutes

## 2024-09-09 DIAGNOSIS — Z96.641 HISTORY OF TOTAL HIP ARTHROPLASTY, RIGHT: Primary | ICD-10-CM

## 2024-10-09 ENCOUNTER — OFFICE VISIT (OUTPATIENT)
Dept: ORTHOPEDICS | Facility: CLINIC | Age: 69
End: 2024-10-09
Payer: MEDICARE

## 2024-10-09 ENCOUNTER — HOSPITAL ENCOUNTER (OUTPATIENT)
Dept: RADIOLOGY | Facility: HOSPITAL | Age: 69
Discharge: HOME | End: 2024-10-09
Attending: NURSE PRACTITIONER
Payer: MEDICARE

## 2024-10-09 DIAGNOSIS — Z96.641 HISTORY OF TOTAL HIP ARTHROPLASTY, RIGHT: ICD-10-CM

## 2024-10-09 DIAGNOSIS — Z96.641 S/P TOTAL RIGHT HIP ARTHROPLASTY: Primary | ICD-10-CM

## 2024-10-09 PROCEDURE — 73502 X-RAY EXAM HIP UNI 2-3 VIEWS: CPT | Mod: TC,RT

## 2024-10-09 PROCEDURE — 3051F HG A1C>EQUAL 7.0%<8.0%: CPT | Mod: CPTII,,, | Performed by: NURSE PRACTITIONER

## 2024-10-09 PROCEDURE — 99024 POSTOP FOLLOW-UP VISIT: CPT | Mod: ,,, | Performed by: NURSE PRACTITIONER

## 2024-10-09 PROCEDURE — 73502 X-RAY EXAM HIP UNI 2-3 VIEWS: CPT | Mod: 26,RT,, | Performed by: RADIOLOGY

## 2024-10-09 PROCEDURE — 1159F MED LIST DOCD IN RCRD: CPT | Mod: CPTII,,, | Performed by: NURSE PRACTITIONER

## 2024-10-09 PROCEDURE — 99999 PR PBB SHADOW E&M-EST. PATIENT-LVL IV: CPT | Mod: PBBFAC,,, | Performed by: NURSE PRACTITIONER

## 2024-10-09 PROCEDURE — 4010F ACE/ARB THERAPY RXD/TAKEN: CPT | Mod: CPTII,,, | Performed by: NURSE PRACTITIONER

## 2024-10-09 PROCEDURE — 99214 OFFICE O/P EST MOD 30 MIN: CPT | Mod: PBBFAC,25 | Performed by: NURSE PRACTITIONER

## 2024-10-09 NOTE — PROGRESS NOTES
HISTORY OF PRESENT ILLNESS:       Pt is here today for First post-operative followup of her No surgery found on No surgery found    she is doing well.  Patient is 6 weeks postop right total hip arthroplasty, doing well.  Her incision looks good today no signs of infection.  After surgery she was admitted to the hospital for acute renal fair failure, was intubated.  Was in the hospital for several days and then discharged home.  She is currently at home with home health.  Has home health PT.  Therapy is working with her 2-3 days a week.  Overall doing well.  Doing well in regards to the hip.    We have reviewed her findings and discussed plan of care and future treatment options, including the physical therapy plan.                                                                                     PHYSICAL EXAMINATION:     Incision sites healed well.   No evidence of any erythema, infection or induration  Range of motion of the hip is appropriate  Neurovascularly, the patient appears to have no deficits in the affected extremity      IMAGING:               EXAMINATION:  XR HIP WITH PELVIS WHEN PERFORMED 2 OR 3 VIEWS RIGHT     CLINICAL HISTORY:  Presence of right artificial hip joint     TECHNIQUE:  AP view of the pelvis and frog leg lateral view of the right hip were performed.     COMPARISON:  08/26/2024     FINDINGS:  There is a total right hip arthroplasty without evidence periprosthetic lucency nor fracture.  The adjacent soft tissues are unremarkable.     Impression:     There is a total right hip arthroplasty.        Electronically signed by:Gerri Ramirez MD  Date:                                            10/10/2024  Time:                                           10:09        Exam Ended: 10/09/24 11:04 CDT Last Resulted: 10/10/24 10:09 CDT                                                                      ASSESSMENT:                                                                                                                                                1. S/P Robotic Arthroplasty,hip - Right  8/26/2024                                                                                                                                PLAN:                                                                                                                                                     1. Continue with PT  2. Emphasized the importance of hip abductor strengthening   3. I have discussed return to activity in detail.  4. she will see us back in 4 weeks.                                      5. All questions were answered and she should contact us if she  has any questions or concerns in the interim.            Return to clinic 4 weeks with Dr. López  There are no Patient Instructions on file for this visit.

## 2024-10-16 DIAGNOSIS — Z96.641 S/P TOTAL RIGHT HIP ARTHROPLASTY: Primary | ICD-10-CM

## 2024-11-06 DIAGNOSIS — Z96.641 S/P TOTAL RIGHT HIP ARTHROPLASTY: Primary | ICD-10-CM

## 2024-11-07 ENCOUNTER — HOSPITAL ENCOUNTER (OUTPATIENT)
Dept: RADIOLOGY | Facility: HOSPITAL | Age: 69
Discharge: HOME | End: 2024-11-07
Attending: ORTHOPAEDIC SURGERY
Payer: MEDICARE

## 2024-11-07 ENCOUNTER — OFFICE VISIT (OUTPATIENT)
Dept: ORTHOPEDICS | Facility: CLINIC | Age: 69
End: 2024-11-07
Payer: MEDICARE

## 2024-11-07 DIAGNOSIS — Z96.641 S/P TOTAL RIGHT HIP ARTHROPLASTY: ICD-10-CM

## 2024-11-07 DIAGNOSIS — Z96.641 S/P TOTAL RIGHT HIP ARTHROPLASTY: Primary | ICD-10-CM

## 2024-11-07 PROCEDURE — 99024 POSTOP FOLLOW-UP VISIT: CPT | Mod: ,,, | Performed by: ORTHOPAEDIC SURGERY

## 2024-11-07 PROCEDURE — 3051F HG A1C>EQUAL 7.0%<8.0%: CPT | Mod: CPTII,,, | Performed by: ORTHOPAEDIC SURGERY

## 2024-11-07 PROCEDURE — 73502 X-RAY EXAM HIP UNI 2-3 VIEWS: CPT | Mod: TC,RT

## 2024-11-07 PROCEDURE — 4010F ACE/ARB THERAPY RXD/TAKEN: CPT | Mod: CPTII,,, | Performed by: ORTHOPAEDIC SURGERY

## 2024-11-07 PROCEDURE — 99999 PR PBB SHADOW E&M-EST. PATIENT-LVL IV: CPT | Mod: PBBFAC,,, | Performed by: ORTHOPAEDIC SURGERY

## 2024-11-07 PROCEDURE — 99214 OFFICE O/P EST MOD 30 MIN: CPT | Mod: PBBFAC,25 | Performed by: ORTHOPAEDIC SURGERY

## 2024-11-07 PROCEDURE — 1159F MED LIST DOCD IN RCRD: CPT | Mod: CPTII,,, | Performed by: ORTHOPAEDIC SURGERY

## 2024-11-07 NOTE — PROGRESS NOTES
HISTORY OF PRESENT ILLNESS:       Pt is here today for Second post-operative followup of her Robotic Arthroplasty,hip - Right on 8/26/2024    she is doing well.    She complains of swelling in both of her feet that she says started after surgery.     We have reviewed her findings and discussed plan of care and future treatment options, including the physical therapy plan.                                                                                     PHYSICAL EXAMINATION:     Incision sites healed well.   No evidence of any erythema, infection or induration  Range of motion of the hip is appropriate  Neurovascularly, the patient appears to have no deficits in the affected extremity      IMAGING:     X-Ray Hip 2 or 3 views Right with Pelvis when performed    Result Date: 11/7/2024  See Procedure Notes for results. IMPRESSION: Please see Ortho procedure notes for report.  This procedure was auto-finalized by: Virtual Radiologist  Three views right hip were obtained today demonstrating stable appearing right total hip arthroplasty in excellent position with no adverse interval change  X-Ray Hip 2 or 3 views Right with Pelvis when performed    Result Date: 10/10/2024  EXAMINATION: XR HIP WITH PELVIS WHEN PERFORMED 2 OR 3 VIEWS RIGHT CLINICAL HISTORY: Presence of right artificial hip joint TECHNIQUE: AP view of the pelvis and frog leg lateral view of the right hip were performed. COMPARISON: 08/26/2024 FINDINGS: There is a total right hip arthroplasty without evidence periprosthetic lucency nor fracture.  The adjacent soft tissues are unremarkable.     There is a total right hip arthroplasty. Electronically signed by: Gerri Ramirez MD Date:    10/10/2024 Time:    10:09                                                                                  ASSESSMENT:                                                                                                                                               1. S/P Robotic  Arthroplasty,hip - Right  8/26/2024                                                                                                                                PLAN:                                                                                                                                                     1. Continue with PT-still not ambulating as well as I would like to see her.  I think she needs more aggressive physical therapy  2. Emphasized the importance of hip abductor strengthening   3. I have discussed return to activity in detail.  4. she will see us back in 6 weeks.                                      5. All questions were answered and she should contact us if she  has any questions or concerns in the interim.              There are no Patient Instructions on file for this visit.

## 2024-12-18 DIAGNOSIS — Z96.641 S/P TOTAL RIGHT HIP ARTHROPLASTY: Primary | ICD-10-CM

## 2024-12-19 ENCOUNTER — OFFICE VISIT (OUTPATIENT)
Dept: ORTHOPEDICS | Facility: CLINIC | Age: 69
End: 2024-12-19
Payer: MEDICARE

## 2024-12-19 ENCOUNTER — HOSPITAL ENCOUNTER (OUTPATIENT)
Dept: RADIOLOGY | Facility: HOSPITAL | Age: 69
Discharge: HOME | End: 2024-12-19
Attending: ORTHOPAEDIC SURGERY
Payer: MEDICARE

## 2024-12-19 DIAGNOSIS — Z96.641 S/P TOTAL RIGHT HIP ARTHROPLASTY: Primary | ICD-10-CM

## 2024-12-19 DIAGNOSIS — Z96.641 S/P TOTAL RIGHT HIP ARTHROPLASTY: ICD-10-CM

## 2024-12-19 PROCEDURE — 4010F ACE/ARB THERAPY RXD/TAKEN: CPT | Mod: CPTII,,, | Performed by: ORTHOPAEDIC SURGERY

## 2024-12-19 PROCEDURE — 73502 X-RAY EXAM HIP UNI 2-3 VIEWS: CPT | Mod: TC,RT

## 2024-12-19 PROCEDURE — 99213 OFFICE O/P EST LOW 20 MIN: CPT | Mod: S$PBB,,, | Performed by: ORTHOPAEDIC SURGERY

## 2024-12-19 PROCEDURE — 3051F HG A1C>EQUAL 7.0%<8.0%: CPT | Mod: CPTII,,, | Performed by: ORTHOPAEDIC SURGERY

## 2024-12-19 PROCEDURE — 99999 PR PBB SHADOW E&M-EST. PATIENT-LVL II: CPT | Mod: PBBFAC,,, | Performed by: ORTHOPAEDIC SURGERY

## 2024-12-19 PROCEDURE — 99212 OFFICE O/P EST SF 10 MIN: CPT | Mod: PBBFAC,25 | Performed by: ORTHOPAEDIC SURGERY

## 2024-12-19 NOTE — PROGRESS NOTES
CC:  Hip pain  69 y.o. Female returns to clinic for a follow up visit regarding     ICD-10-CM ICD-9-CM   1. S/P total right hip arthroplasty  Z96.641 V43.64       Patient is here for recheck right hip. States she is doing better today.      PAST MEDICAL HISTORY:   Past Medical History:   Diagnosis Date    Diabetes mellitus, type 2     Hypertension        PAST SURGICAL HISTORY:   Past Surgical History:   Procedure Laterality Date    HAND SURGERY      ROBOTIC ARTHROPLASTY, HIP Right 8/26/2024    Procedure: ROBOTIC ARTHROPLASTY,HIP;  Surgeon: Evert López MD;  Location: [facility] OR;  Service: Orthopedics;  Laterality: Right;    SHOULDER ARTHROSCOPY      TUBAL LIGATION           PHYSICAL EXAMINATION:  Ortho/SPM Exam  Leg lengths equal and symmetric.    NVID  No pain with kathya/fadir    IMAGING:  X-Ray Hip 2 or 3 views Right with Pelvis when performed    Result Date: 12/19/2024  See Procedure Notes for results. IMPRESSION: Please see Ortho procedure notes for report.  This procedure was auto-finalized by: Virtual Radiologist     3 Radiographs of the right hip were performed today.  Radiographs demonstrate a well positioned hip arthroplasty. Components show no signs of loosening or fracture.  The lesser trochanter is appropriately aligned with the contralateral lesser trochanter.  No adverse interval change compared to prior imaging.       ASSESSMENT:      ICD-10-CM ICD-9-CM   1. S/P total right hip arthroplasty  Z96.641 V43.64       PLAN:     -Findings and treatment options were discussed with the patient  -All questions answered  Natural history and expected course discussed. Questions answered.  Educational materials distributed.  Home exercises discussed.    Doing ok.  Cont to progress her mobility.  See back in 3 moths    There are no Patient Instructions on file for this visit.    No orders of the defined types were placed in this encounter.      Procedures  Patient

## (undated) DEVICE — GLOVE SENSICARE PI GRN 8

## (undated) DEVICE — DRILL BIT 4X40MM

## (undated) DEVICE — KIT DRAPE RIO ONE PIECE W/POCK

## (undated) DEVICE — SET CYSTO IRR DRP CHMBR 84IN

## (undated) DEVICE — Device

## (undated) DEVICE — RETRIEVER SUTURE HEWSON DISP

## (undated) DEVICE — BIT DRILL 2.0MM D DEPTH 110MM

## (undated) DEVICE — SUT VICRYL PLUS COATED 1 27IN

## (undated) DEVICE — PIN BONE 4 X 140MM STERILE
Type: IMPLANTABLE DEVICE | Site: HIP | Status: NON-FUNCTIONAL
Removed: 2024-08-26

## (undated) DEVICE — STAPLER SKIN ROTATING HEAD

## (undated) DEVICE — DECANTER FLUID TRNSF WHITE 9IN

## (undated) DEVICE — CHECKPOINT IMPACT 3.5MM HEX ST

## (undated) DEVICE — TUBE SUCTION KAMVAC MINI 20/BX

## (undated) DEVICE — SUT 2-0 VICRYL / CT-1

## (undated) DEVICE — KIT IRR SUCTION HND PIECE

## (undated) DEVICE — BIT DRILL 2.7MM STRAIGHT

## (undated) DEVICE — GLOVE SENSICARE PI GRN 7

## (undated) DEVICE — MARKER SKIN RULER AND LABEL

## (undated) DEVICE — DRAPE INCISE IOBAN 2 23X23IN

## (undated) DEVICE — SUT #2 TI-CRON HGS-21 30IN

## (undated) DEVICE — BANDAGE COHES LTX TAN 4INX5YD

## (undated) DEVICE — GLOVE SENSICARE PI SURG 6.5

## (undated) DEVICE — NDL QUINCKE SPINAL 18G 3.5IN

## (undated) DEVICE — KIT CHECKPOINT TIBIAL

## (undated) DEVICE — BLADE SURG #15 CARBON STEEL

## (undated) DEVICE — KIT TRACKING VIZADISC HIP

## (undated) DEVICE — GLOVE SENSICARE PI GRN 7.5

## (undated) DEVICE — GLOVE SENSICARE PI SURG 7.5

## (undated) DEVICE — GLOVE SENSICARE PI SURG 7

## (undated) DEVICE — SOL NACL IRR 1000ML BTL

## (undated) DEVICE — GLOVE SENSICARE PI GRN 6.5

## (undated) DEVICE — SPACESUIT TOGA T5 ZIPPER PEEL

## (undated) DEVICE — SYR 50CC LL

## (undated) DEVICE — APPLICATOR CHLORAPREP ORN 26ML

## (undated) DEVICE — DRESSING GAUZE XEROFORM 5X9

## (undated) DEVICE — OVERLAY MATTRESS WAFFLE